# Patient Record
Sex: FEMALE | Race: WHITE | NOT HISPANIC OR LATINO | Employment: FULL TIME | URBAN - METROPOLITAN AREA
[De-identification: names, ages, dates, MRNs, and addresses within clinical notes are randomized per-mention and may not be internally consistent; named-entity substitution may affect disease eponyms.]

---

## 2018-08-30 RX ORDER — METFORMIN HCL 500 MG
1000 TABLET, EXTENDED RELEASE 24 HR ORAL DAILY
COMMUNITY

## 2018-08-30 RX ORDER — TOLTERODINE 4 MG/1
4 CAPSULE, EXTENDED RELEASE ORAL DAILY
COMMUNITY

## 2018-08-31 PROBLEM — N95.0 POST-MENOPAUSAL BLEEDING: Status: ACTIVE | Noted: 2018-08-31

## 2018-09-04 ENCOUNTER — ANESTHESIA EVENT (OUTPATIENT)
Dept: SURGERY | Facility: CLINIC | Age: 58
End: 2018-09-04
Payer: COMMERCIAL

## 2018-09-05 ENCOUNTER — ANESTHESIA (OUTPATIENT)
Dept: SURGERY | Facility: CLINIC | Age: 58
End: 2018-09-05
Payer: COMMERCIAL

## 2018-09-05 ENCOUNTER — SURGERY (OUTPATIENT)
Age: 58
End: 2018-09-05

## 2018-09-05 ENCOUNTER — HOSPITAL ENCOUNTER (OUTPATIENT)
Facility: CLINIC | Age: 58
Discharge: HOME | End: 2018-09-05
Attending: SPECIALIST | Admitting: SPECIALIST
Payer: COMMERCIAL

## 2018-09-05 VITALS
RESPIRATION RATE: 16 BRPM | TEMPERATURE: 98.1 F | SYSTOLIC BLOOD PRESSURE: 112 MMHG | HEIGHT: 63 IN | BODY MASS INDEX: 39.46 KG/M2 | OXYGEN SATURATION: 97 % | HEART RATE: 86 BPM | WEIGHT: 222.7 LBS | DIASTOLIC BLOOD PRESSURE: 75 MMHG

## 2018-09-05 DIAGNOSIS — N95.0 POST-MENOPAUSAL BLEEDING: Primary | ICD-10-CM

## 2018-09-05 LAB — B-HCG SERPL-ACNC: 3 IU/L (ref 0–5)

## 2018-09-05 PROCEDURE — 25000128 H RX IP 250 OP 636: Performed by: NURSE ANESTHETIST, CERTIFIED REGISTERED

## 2018-09-05 PROCEDURE — 36000058 ZZH SURGERY LEVEL 3 EA 15 ADDTL MIN: Performed by: SPECIALIST

## 2018-09-05 PROCEDURE — 88305 TISSUE EXAM BY PATHOLOGIST: CPT | Mod: 26 | Performed by: SPECIALIST

## 2018-09-05 PROCEDURE — 84702 CHORIONIC GONADOTROPIN TEST: CPT | Performed by: SPECIALIST

## 2018-09-05 PROCEDURE — 25800025 ZZH RX 258: Performed by: SPECIALIST

## 2018-09-05 PROCEDURE — 25000125 ZZHC RX 250: Performed by: NURSE ANESTHETIST, CERTIFIED REGISTERED

## 2018-09-05 PROCEDURE — 71000013 ZZH RECOVERY PHASE 1 LEVEL 1 EA ADDTL HR: Performed by: SPECIALIST

## 2018-09-05 PROCEDURE — 25000566 ZZH SEVOFLURANE, EA 15 MIN: Performed by: SPECIALIST

## 2018-09-05 PROCEDURE — 25000128 H RX IP 250 OP 636: Performed by: ANESTHESIOLOGY

## 2018-09-05 PROCEDURE — 40000170 ZZH STATISTIC PRE-PROCEDURE ASSESSMENT II: Performed by: SPECIALIST

## 2018-09-05 PROCEDURE — 36415 COLL VENOUS BLD VENIPUNCTURE: CPT | Performed by: SPECIALIST

## 2018-09-05 PROCEDURE — 88305 TISSUE EXAM BY PATHOLOGIST: CPT | Performed by: SPECIALIST

## 2018-09-05 PROCEDURE — 25000128 H RX IP 250 OP 636: Performed by: SPECIALIST

## 2018-09-05 PROCEDURE — 36000056 ZZH SURGERY LEVEL 3 1ST 30 MIN: Performed by: SPECIALIST

## 2018-09-05 PROCEDURE — 37000008 ZZH ANESTHESIA TECHNICAL FEE, 1ST 30 MIN: Performed by: SPECIALIST

## 2018-09-05 PROCEDURE — 71000012 ZZH RECOVERY PHASE 1 LEVEL 1 FIRST HR: Performed by: SPECIALIST

## 2018-09-05 PROCEDURE — 25000132 ZZH RX MED GY IP 250 OP 250 PS 637: Performed by: SPECIALIST

## 2018-09-05 PROCEDURE — 27210794 ZZH OR GENERAL SUPPLY STERILE: Performed by: SPECIALIST

## 2018-09-05 PROCEDURE — 37000009 ZZH ANESTHESIA TECHNICAL FEE, EACH ADDTL 15 MIN: Performed by: SPECIALIST

## 2018-09-05 PROCEDURE — 71000027 ZZH RECOVERY PHASE 2 EACH 15 MINS: Performed by: SPECIALIST

## 2018-09-05 RX ORDER — FENTANYL CITRATE 50 UG/ML
25-50 INJECTION, SOLUTION INTRAMUSCULAR; INTRAVENOUS
Status: DISCONTINUED | OUTPATIENT
Start: 2018-09-05 | End: 2018-09-05 | Stop reason: HOSPADM

## 2018-09-05 RX ORDER — ALBUTEROL SULFATE 0.83 MG/ML
2.5 SOLUTION RESPIRATORY (INHALATION) EVERY 4 HOURS PRN
Status: DISCONTINUED | OUTPATIENT
Start: 2018-09-05 | End: 2018-09-05 | Stop reason: HOSPADM

## 2018-09-05 RX ORDER — DEXAMETHASONE SODIUM PHOSPHATE 4 MG/ML
INJECTION, SOLUTION INTRA-ARTICULAR; INTRALESIONAL; INTRAMUSCULAR; INTRAVENOUS; SOFT TISSUE PRN
Status: DISCONTINUED | OUTPATIENT
Start: 2018-09-05 | End: 2018-09-05

## 2018-09-05 RX ORDER — PROPOFOL 10 MG/ML
INJECTION, EMULSION INTRAVENOUS PRN
Status: DISCONTINUED | OUTPATIENT
Start: 2018-09-05 | End: 2018-09-05

## 2018-09-05 RX ORDER — SODIUM CHLORIDE, SODIUM LACTATE, POTASSIUM CHLORIDE, CALCIUM CHLORIDE 600; 310; 30; 20 MG/100ML; MG/100ML; MG/100ML; MG/100ML
INJECTION, SOLUTION INTRAVENOUS CONTINUOUS PRN
Status: DISCONTINUED | OUTPATIENT
Start: 2018-09-05 | End: 2018-09-05

## 2018-09-05 RX ORDER — ONDANSETRON 2 MG/ML
INJECTION INTRAMUSCULAR; INTRAVENOUS PRN
Status: DISCONTINUED | OUTPATIENT
Start: 2018-09-05 | End: 2018-09-05

## 2018-09-05 RX ORDER — HYDROCODONE BITARTRATE AND ACETAMINOPHEN 5; 325 MG/1; MG/1
1-2 TABLET ORAL EVERY 4 HOURS PRN
Qty: 5 TABLET | Refills: 0 | Status: ON HOLD | OUTPATIENT
Start: 2018-09-05 | End: 2021-05-17

## 2018-09-05 RX ORDER — SODIUM CHLORIDE, SODIUM LACTATE, POTASSIUM CHLORIDE, CALCIUM CHLORIDE 600; 310; 30; 20 MG/100ML; MG/100ML; MG/100ML; MG/100ML
INJECTION, SOLUTION INTRAVENOUS CONTINUOUS
Status: DISCONTINUED | OUTPATIENT
Start: 2018-09-05 | End: 2018-09-05 | Stop reason: HOSPADM

## 2018-09-05 RX ORDER — HYDROCODONE BITARTRATE AND ACETAMINOPHEN 5; 325 MG/1; MG/1
1 TABLET ORAL
Status: COMPLETED | OUTPATIENT
Start: 2018-09-05 | End: 2018-09-05

## 2018-09-05 RX ORDER — ACETAMINOPHEN 650 MG/1
650 SUPPOSITORY RECTAL EVERY 4 HOURS PRN
Status: DISCONTINUED | OUTPATIENT
Start: 2018-09-05 | End: 2018-09-05 | Stop reason: HOSPADM

## 2018-09-05 RX ORDER — NALOXONE HYDROCHLORIDE 0.4 MG/ML
.1-.4 INJECTION, SOLUTION INTRAMUSCULAR; INTRAVENOUS; SUBCUTANEOUS
Status: DISCONTINUED | OUTPATIENT
Start: 2018-09-05 | End: 2018-09-05 | Stop reason: HOSPADM

## 2018-09-05 RX ORDER — ONDANSETRON 2 MG/ML
4 INJECTION INTRAMUSCULAR; INTRAVENOUS EVERY 30 MIN PRN
Status: DISCONTINUED | OUTPATIENT
Start: 2018-09-05 | End: 2018-09-05 | Stop reason: HOSPADM

## 2018-09-05 RX ORDER — HYDROMORPHONE HYDROCHLORIDE 1 MG/ML
.3-.5 INJECTION, SOLUTION INTRAMUSCULAR; INTRAVENOUS; SUBCUTANEOUS EVERY 10 MIN PRN
Status: DISCONTINUED | OUTPATIENT
Start: 2018-09-05 | End: 2018-09-05 | Stop reason: HOSPADM

## 2018-09-05 RX ORDER — MEPERIDINE HYDROCHLORIDE 25 MG/ML
12.5 INJECTION INTRAMUSCULAR; INTRAVENOUS; SUBCUTANEOUS
Status: DISCONTINUED | OUTPATIENT
Start: 2018-09-05 | End: 2018-09-05 | Stop reason: HOSPADM

## 2018-09-05 RX ORDER — ONDANSETRON 4 MG/1
4 TABLET, ORALLY DISINTEGRATING ORAL EVERY 30 MIN PRN
Status: DISCONTINUED | OUTPATIENT
Start: 2018-09-05 | End: 2018-09-05 | Stop reason: HOSPADM

## 2018-09-05 RX ORDER — KETOROLAC TROMETHAMINE 30 MG/ML
30 INJECTION, SOLUTION INTRAMUSCULAR; INTRAVENOUS ONCE
Status: COMPLETED | OUTPATIENT
Start: 2018-09-05 | End: 2018-09-05

## 2018-09-05 RX ORDER — PROPOFOL 10 MG/ML
INJECTION, EMULSION INTRAVENOUS CONTINUOUS PRN
Status: DISCONTINUED | OUTPATIENT
Start: 2018-09-05 | End: 2018-09-05

## 2018-09-05 RX ORDER — FENTANYL CITRATE 50 UG/ML
INJECTION, SOLUTION INTRAMUSCULAR; INTRAVENOUS PRN
Status: DISCONTINUED | OUTPATIENT
Start: 2018-09-05 | End: 2018-09-05

## 2018-09-05 RX ORDER — LIDOCAINE HYDROCHLORIDE 20 MG/ML
INJECTION, SOLUTION INFILTRATION; PERINEURAL PRN
Status: DISCONTINUED | OUTPATIENT
Start: 2018-09-05 | End: 2018-09-05

## 2018-09-05 RX ADMIN — FENTANYL CITRATE 25 MCG: 50 INJECTION, SOLUTION INTRAMUSCULAR; INTRAVENOUS at 07:37

## 2018-09-05 RX ADMIN — MIDAZOLAM 2 MG: 1 INJECTION INTRAMUSCULAR; INTRAVENOUS at 07:31

## 2018-09-05 RX ADMIN — PHENYLEPHRINE HYDROCHLORIDE 100 MCG: 10 INJECTION, SOLUTION INTRAMUSCULAR; INTRAVENOUS; SUBCUTANEOUS at 07:39

## 2018-09-05 RX ADMIN — PROPOFOL 200 MG: 10 INJECTION, EMULSION INTRAVENOUS at 07:37

## 2018-09-05 RX ADMIN — LIDOCAINE HYDROCHLORIDE 40 MG: 20 INJECTION, SOLUTION INFILTRATION; PERINEURAL at 07:37

## 2018-09-05 RX ADMIN — DEXAMETHASONE SODIUM PHOSPHATE 4 MG: 4 INJECTION, SOLUTION INTRA-ARTICULAR; INTRALESIONAL; INTRAMUSCULAR; INTRAVENOUS; SOFT TISSUE at 07:44

## 2018-09-05 RX ADMIN — HYDROCODONE BITARTRATE AND ACETAMINOPHEN 1 TABLET: 5; 325 TABLET ORAL at 09:07

## 2018-09-05 RX ADMIN — PHENYLEPHRINE HYDROCHLORIDE 200 MCG: 10 INJECTION, SOLUTION INTRAMUSCULAR; INTRAVENOUS; SUBCUTANEOUS at 07:48

## 2018-09-05 RX ADMIN — FENTANYL CITRATE 50 MCG: 50 INJECTION INTRAMUSCULAR; INTRAVENOUS at 08:32

## 2018-09-05 RX ADMIN — ONDANSETRON 4 MG: 2 INJECTION INTRAMUSCULAR; INTRAVENOUS at 07:43

## 2018-09-05 RX ADMIN — SODIUM CHLORIDE 200 ML: 900 IRRIGANT IRRIGATION at 08:00

## 2018-09-05 RX ADMIN — KETOROLAC TROMETHAMINE 30 MG: 30 INJECTION, SOLUTION INTRAMUSCULAR at 08:15

## 2018-09-05 RX ADMIN — SODIUM CHLORIDE, POTASSIUM CHLORIDE, SODIUM LACTATE AND CALCIUM CHLORIDE: 600; 310; 30; 20 INJECTION, SOLUTION INTRAVENOUS at 07:31

## 2018-09-05 RX ADMIN — PHENYLEPHRINE HYDROCHLORIDE 100 MCG: 10 INJECTION, SOLUTION INTRAMUSCULAR; INTRAVENOUS; SUBCUTANEOUS at 07:42

## 2018-09-05 RX ADMIN — PROPOFOL 200 MCG/KG/MIN: 10 INJECTION, EMULSION INTRAVENOUS at 07:37

## 2018-09-05 ASSESSMENT — ENCOUNTER SYMPTOMS: ORTHOPNEA: 0

## 2018-09-05 NOTE — OP NOTE
Procedure Date: 2018      DATE OF PROCEDURE: 2018.      PREOPERATIVE DIAGNOSIS:  Postmenopausal bleeding.      POSTOPERATIVE DIAGNOSIS:  Postmenopausal bleeding, endometrial polyp.      PROCEDURE:  Hysteroscopy, dilatation and curettage.      SURGEON:  April Hankins MD      ANESTHESIA:  General.      FINDINGS:  Small endometrial polyp on the posterior wall of the uterus.      ESTIMATED BLOOD LOSS:  Less than 5 mL.      COMPLICATIONS:  None.      INDICATIONS FOR SURGERY:  The patient is a 57-year-old female status post menopause 3 years ago who presented to the clinic with postmenopausal bleeding.  An ultrasound was performed which showed a thickened endometrium at 7 mm.  Endometrial biopsy was negative.  The decision was made to proceed with hysteroscopy, D and C for a  definitive diagnosis and management.  Risks and benefits were discussed including bleeding, infection, injury to the uterus including perforation.      DESCRIPTION OF PROCEDURE:  After the administration of general anesthetic, the patient was prepped and draped in the usual fashion.  A straight catheter was used to drain her bladder.  A timeout was held.      A speculum was placed within the vagina.  A single-tooth tenaculum placed on the anterior lip of the cervix.  Cervix was dilated to 5 mm.  The hysteroscope was inserted through.  There was minimal endometrial tissue, one small polyp on the posterior aspect.      Hysteroscope was removed and sharp curettage was performed with a small amount of tissue and polyp noted.  The hysteroscope was inserted again, no further tissue noted.  All instruments were removed.  Tenaculum sites were noted to be hemostatic.  The patient was recalled from anesthetic without difficulty.  Sponge counts correct.         APRIL HANKINS MD             D: 2018   T: 2018   MT: AUGUSTINE      Name:     МАРИНА MONGE   MRN:      [id number]        Account:        [id number]   :       1960           Procedure Date: 09/05/2018      Document: [id number]

## 2018-09-05 NOTE — IP AVS SNAPSHOT
MRN:[id number]                      After Visit Summary   9/5/2018    Estela Menjivar    MRN: [id number]           Thank you!     Thank you for choosing [facility] for your care. Our goal is always to provide you with excellent care. Hearing back from our patients is one way we can continue to improve our services. Please take a few minutes to complete the written survey that you may receive in the mail after you visit with us. Thank you!        Patient Information     Date Of Birth          1960        About your hospital stay     You were admitted on:  September 5, 2018 You last received care in the:  [facility]    You were discharged on:  September 5, 2018       Who to Call     For medical emergencies, please call 911.  For non-urgent questions about your medical care, please call your primary care provider or clinic, [phone]  For questions related to your surgery, please call your surgery clinic        Attending Provider     Provider Specialty    April Lara MD OB/Gyn       Primary Care Provider Office Phone # Fax #    Lory Luke [phone]      After Care Instructions     Discharge Instructions       Pelvic Rest. No tampons, douching or intercourse for  1  weeks.            Discharge Instructions       Patient to arrange follow up appointment in 1-2 weeks            Shower        Shower on Post-op day  1.   DO NOT take a bath                  Further instructions from your care team       Same Day Surgery Discharge Instructions for  Sedation and General Anesthesia       It's not unusual to feel dizzy, light-headed or faint for up to 24 hours after surgery or while taking pain medication.  If you have these symptoms: sit for a few minutes before standing and have someone assist you when you get up to walk or use the bathroom.      You should rest and relax for the next 24 hours. We recommend you make arrangements to have an adult  stay with you for at least 24 hours after your discharge.  Avoid hazardous and strenuous activity.      DO NOT DRIVE any vehicle or operate mechanical equipment for 24 hours following the end of your surgery.  Even though you may feel normal, your reactions may be affected by the medication you have received.      Do not drink alcoholic beverages for 24 hours following surgery.       Slowly progress to your regular diet as you feel able. It's not unusual to feel nauseated and/or vomit after receiving anesthesia.  If you develop these symptoms, drink clear liquids (apple juice, ginger ale, broth, 7-up, etc. ) until you feel better.  If your nausea and vomiting persists for 24 hours, please notify your surgeon.        All narcotic pain medications, along with inactivity and anesthesia, can cause constipation. Drinking plenty of liquids and increasing fiber intake will help.      For any questions of a medical nature, call your surgeon.      Do not make important decisions for 24 hours.      If you had general anesthesia, you may have a sore throat for a couple of days related to the breathing tube used during surgery.  You may use Cepacol lozenges to help with this discomfort.  If it worsens or if you develop a fever, contact your surgeon.     If you feel your pain is not well managed with the pain medications prescribed by your surgeon, please contact your surgeon's office to let them know so they can address your concerns.       D & C DISCHARGE INSTRUCTIONS      ACTIVITIES:    You may resume normal activities including lifting as needed, but no intercourse for 2 weeks.  You may climb stairs, bathe or shower.   Unless you are taking narcotic pain medication, it is permissible to drive a car in 24 hours.    FOLLOW-UP:  As instructed by surgeon        VAGINAL DRAINAGE: You may have some vaginal bleeding or discharge for about a week after discharge. Tampons may be inserted into the vagina for the discharge or  "bleeding.    WHEN TO CALL YOUR DOCTOR:  Call your doctor right away if you have any of the following:  Fever above 100.4 F (38 C) or chills  Vaginal bleeding that soaks more than one sanitary pad per hour  A foul smelling discharge from the vagina  Difficulty urinating  Severe pain                    [facility]   MARTIN Zee, MOLINA Palacio R. Ormsby, M.Kasbohm &SHERMAN Beckham      [facility] Office:    [facility] Office:    [address] [place] [address]    [address] [phone]    [facility] [place] [address] [phone]         Today you received Toradol, an antiinflammatory medication similar to Ibuprofen.  You should not take other antiinflammatory medication, such as Ibuprofen, Motrin, Advil, Aleve, Naprosyn, etc until 2:15pm.         Pending Results     Date and Time Order Name Status Description    9/5/2018 0754 Surgical pathology exam In process             Admission Information     Date & Time Provider Department Dept. Phone    9/5/2018 April Lara MD [facility] [phone]      Your Vitals Were     Blood Pressure Pulse Temperature Respirations Height Weight    99/73 86 98.1  F (36.7  C) 11 1.6 m (5' 3\") 101 kg (222 lb 11.2 oz)    Pulse Oximetry BMI (Body Mass Index)                94% 39.45 kg/m2          [employer] Information     [employer] lets you send messages to your doctor, view your test results, renew your prescriptions, schedule appointments and more. To sign up, go to [email or web address] . Click on \"Log in\" on the left side of the screen, which will take you to the Welcome page. Then click on \"Sign up Now\" on the right side of the page.     You will be asked to enter the access code listed below, as well as some personal information. Please follow the directions to create your username and password.     Your access code is: [email or web address]  Expires: 12/4/2018  " 8:24 AM     Your access code will  in 90 days. If you need help or a new code, please call your [place] clinic or [phone].        Care EveryWhere ID     This is your Care EveryWhere ID. This could be used by other organizations to access your [place] medical records  [id number]        Equal Access to Services     JAY MARTIN : [place] olea hadjacksonjanice Sorogelioali, waaxda luqadaha, qaybta kaalmada nalinichavezdario, dina villanuevahalinaskye cameron . So [facility] [phone].    ATENCIÓN: Si habla español, tiene a zarate disposición servicios gratuitos de asistencia lingüística. Albin al [phone].    We comply with applicable federal civil rights laws and [place] laws. We do not discriminate on the basis of race, color, national origin, age, disability, sex, sexual orientation, or gender identity.               Review of your medicines      START taking        Dose / Directions    HYDROcodone-acetaminophen 5-325 MG per tablet   Commonly known as:  NORCO   Used for:  Post-menopausal bleeding   Notes to Patient:  ONE TABLET TAKEN @ 9:07AM        Dose:  1-2 tablet   Take 1-2 tablets by mouth every 4 hours as needed for other (Moderate to Severe Pain)   Quantity:  5 tablet   Refills:  0         CONTINUE these medicines which have NOT CHANGED        Dose / Directions    DETROL LA PO        Dose:  4 mg   Take 4 mg by mouth daily   Refills:  0       GLUCOPHAGE XR PO        Dose:  500 mg   Take 500 mg by mouth daily   Refills:  0       IBUPROFEN PO   Notes to Patient:  START AFTER 2:15PM        Take  by mouth.   Refills:  0       lisinopril 10 MG tablet   Commonly known as:  PRINIVIL/ZESTRIL        Dose:  20 mg   Take 20 mg by mouth daily   Refills:  0       SIMVASTATIN PO        Dose:  20 mg   Take 20 mg by mouth daily   Refills:  0       TYLENOL PO        Take  by mouth.   Refills:  0            Where to get your medicines      Some of these will need a paper prescription and others can be bought over the counter.  Ask your nurse if you have questions.     Bring a paper prescription for each of these medications     HYDROcodone-acetaminophen 5-325 MG per tablet                Protect others around you: Learn how to safely use, store and throw away your medicines at www.disposemymeds.org.        Information about OPIOIDS     PRESCRIPTION OPIOIDS: WHAT YOU NEED TO KNOW   We gave you an opioid (narcotic) pain medicine. It is important to manage your pain, but opioids are not always the best choice. You should first try all the other options your care team gave you. Take this medicine for as short a time (and as few doses) as possible.    Some activities can increase your pain, such as bandage changes or therapy sessions. It may help to take your pain medicine 30 to 60 minutes before these activities. Reduce your stress by getting enough sleep, working on hobbies you enjoy and practicing relaxation or meditation. Talk to your care team about ways to manage your pain beyond prescription opioids.    These medicines have risks:    DO NOT drive when on new or higher doses of pain medicine. These medicines can affect your alertness and reaction times, and you could be arrested for driving under the influence (DUI). If you need to use opioids long-term, talk to your care team about driving.    DO NOT operate heavy machinery    DO NOT do any other dangerous activities while taking these medicines.    DO NOT drink any alcohol while taking these medicines.     If the opioid prescribed includes acetaminophen, DO NOT take with any other medicines that contain acetaminophen. Read all labels carefully. Look for the word  acetaminophen  or  Tylenol.  Ask your pharmacist if you have questions or are unsure.    You can get addicted to pain medicines, especially if you have a history of addiction (chemical, alcohol or substance dependence). Talk to your care team about ways to reduce this risk.    All opioids tend to cause constipation. Drink  plenty of water and eat foods that have a lot of fiber, such as fruits, vegetables, prune juice, apple juice and high-fiber cereal. Take a laxative (Miralax, milk of magnesia, Colace, Senna) if you don t move your bowels at least every other day. Other side effects include upset stomach, sleepiness, dizziness, throwing up, tolerance (needing more of the medicine to have the same effect), physical dependence and slowed breathing.    Store your pills in a secure place, locked if possible. We will not replace any lost or stolen medicine. If you don t finish your medicine, please throw away (dispose) as directed by your pharmacist. The [place] Pollution Control Agency has more information about safe disposal: https://www.pca.[facility].[place].us/living-green/managing-unwanted-medications             Medication List: This is a list of all your medications and when to take them. Check marks below indicate your daily home schedule. Keep this list as a reference.      Medications           Morning Afternoon Evening Bedtime As Needed    DETROL LA PO   Take 4 mg by mouth daily                                GLUCOPHAGE XR PO   Take 500 mg by mouth daily                                HYDROcodone-acetaminophen 5-325 MG per tablet   Commonly known as:  NORCO   Take 1-2 tablets by mouth every 4 hours as needed for other (Moderate to Severe Pain)   Last time this was given:  1 tablet on 9/5/2018  9:07 AM   Notes to Patient:  ONE TABLET TAKEN @ 9:07AM                                IBUPROFEN PO   Take  by mouth.   Notes to Patient:  START AFTER 2:15PM                                lisinopril 10 MG tablet   Commonly known as:  PRINIVIL/ZESTRIL   Take 20 mg by mouth daily                                SIMVASTATIN PO   Take 20 mg by mouth daily                                TYLENOL PO   Take  by mouth.

## 2018-09-05 NOTE — ANESTHESIA CARE TRANSFER NOTE
Patient: Estela Menjivar    Procedure(s):  COMBINED DILATION AND CURETTAGE, HYSTEROSCOPY  - Wound Class: II-Clean Contaminated    Diagnosis: POST MENOPAUSAL BLEEDING   Diagnosis Additional Information: No value filed.    Anesthesia Type:   General, LMA     Note:  Airway :Face Mask  Patient transferred to:PACU  Comments: Transferred to PACU, spontaneous respirations, 10L oxygen via facemask.  All monitors and alarms on and functioning, VSS.  Patient awake, comfortable.  Report to PACU RN.Handoff Report: Identifed the Patient, Identified the Reponsible Provider, Reviewed the pertinent medical history, Discussed the surgical course, Reviewed Intra-OP anesthesia mangement and issues during anesthesia, Set expectations for post-procedure period and Allowed opportunity for questions and acknowledgement of understanding      Vitals: (Last set prior to Anesthesia Care Transfer)    CRNA VITALS  9/5/2018 0737 - 9/5/2018 0811      9/5/2018             Pulse: 88    SpO2: 95 %    Resp Rate (observed): 14                Electronically Signed By: MARIFER Scott CRNA  September 5, 2018  8:11 AM

## 2018-09-05 NOTE — ANESTHESIA PREPROCEDURE EVALUATION
Anesthesia Evaluation     . Pt has had prior anesthetic.     No history of anesthetic complications          ROS/MED HX    ENT/Pulmonary:      (-) asthma and sleep apnea   Neurologic:       Cardiovascular:     (+) Dyslipidemia, hypertension----. : . . . :. .      (-) DÍAZ, orthopnea/PND and syncope   METS/Exercise Tolerance:  >4 METS   Hematologic:     (+) Anemia, -      Musculoskeletal:         GI/Hepatic: Comment: Splenectomy-splenosis        (-) GERD   Renal/Genitourinary: Comment: UI    (+) Nephrolithiasis ,       Endo: Comment: Vag bleeding    (+) type II DM Obesity, .      Psychiatric:         Infectious Disease:         Malignancy:         Other: Comment: Left eye tear duct dyfn                     Physical Exam      Airway   Mallampati: II  TM distance: >3 FB  Neck ROM: full    Dental   (+) caps    Cardiovascular   Rhythm and rate: regular      Pulmonary    breath sounds clear to auscultation                    Anesthesia Plan      History & Physical Review  History and physical reviewed and following examination; no interval change.    ASA Status:  2 .        Plan for General and LMA   PONV prophylaxis:  Ondansetron (or other 5HT-3) and Dexamethasone or Solumedrol  Propofol infusion      Postoperative Care      Consents  Anesthetic plan, risks, benefits and alternatives discussed with:  Patient..                          .  Procedure: Procedure(s):  COMBINED DILATION AND CURETTAGE, OPERATIVE HYSTEROSCOPY  Preop diagnosis: POST MENOPAUSAL BLEEDING     No Known Allergies  Past Medical History:   Diagnosis Date     Diabetes mellitus (H)      H/O splenectomy      Hyperlipidemia      Hypertension      IFG (impaired fasting glucose)      Kidney stones      Splenosis      Urinary incontinence      Past Surgical History:   Procedure Laterality Date     ABDOMEN SURGERY      laparotomy( colon nick ), repair liver bleed,spleenectomy     COLONOSCOPY       GYN SURGERY      c. section     HERNIA REPAIR      ventral  hernia     ORTHOPEDIC SURGERY      ankle fracture repair     spleenectomy       Social History   Substance Use Topics     Smoking status: Never Smoker     Smokeless tobacco: Never Used     Alcohol use Yes      Comment: 1/month     Prior to Admission medications    Medication Sig Start Date End Date Taking? Authorizing Provider   Acetaminophen (TYLENOL PO) Take  by mouth.   Yes Reported, Patient   IBUPROFEN PO Take  by mouth.   Yes Reported, Patient   lisinopril (LISINOPRIL PO) Take 20 mg by mouth daily    Yes Reported, Patient   MetFORMIN HCl (GLUCOPHAGE XR PO) Take 500 mg by mouth daily   Yes Reported, Patient   SIMVASTATIN PO Take 20 mg by mouth daily    Yes Reported, Patient   Tolterodine Tartrate (DETROL LA PO) Take 4 mg by mouth daily   Yes Reported, Patient     Current Facility-Administered Medications Ordered in Epic   Medication Dose Route Frequency Last Rate Last Dose     PRE OP antibiotics NOT needed for this surgical procedure  1 each As instructed Continuous         No current [facility]-ordered outpatient prescriptions on file.       no pre procedure antibiotic needed       Wt Readings from Last 1 Encounters:   09/05/18 101 kg (222 lb 11.2 oz)     Temp Readings from Last 1 Encounters:   09/05/18 36.2  C (97.1  F) (Oral)     BP Readings from Last 6 Encounters:   09/05/18 142/81   01/06/12 109/76     Pulse Readings from Last 4 Encounters:   09/05/18 86   01/05/12 78     Resp Readings from Last 1 Encounters:   09/05/18 18     SpO2 Readings from Last 1 Encounters:   09/05/18 95%     Recent Labs   Lab Test  01/05/12   2310   NA  138   POTASSIUM  4.0   CHLORIDE  103   CO2  26   ANIONGAP  9   GLC  113*   BUN  13   CR  0.58   ELPIDIO  9.3     No results for input(s): AST, ALT, ALKPHOS, BILITOTAL, LIPASE in the last 07207 hours.  Recent Labs   Lab Test  01/05/12   2310   WBC  13.0*   HGB  11.5*   PLT  489*     No results for input(s): ABO, RH in the last 41856 hours.  No results for input(s): INR, PTT in the last 97529  hours.   No results for input(s): TROPI in the last 09699 hours.  No results for input(s): PH, PCO2, PO2, HCO3 in the last 92602 hours.  No results for input(s): HCG in the last 22444 hours.  No results found for this or any previous visit (from the past 744 hour(s)).    RECENT LABS:   ECG:   ECHO:

## 2018-09-05 NOTE — DISCHARGE INSTRUCTIONS
Same Day Surgery Discharge Instructions for  Sedation and General Anesthesia       It's not unusual to feel dizzy, light-headed or faint for up to 24 hours after surgery or while taking pain medication.  If you have these symptoms: sit for a few minutes before standing and have someone assist you when you get up to walk or use the bathroom.      You should rest and relax for the next 24 hours. We recommend you make arrangements to have an adult stay with you for at least 24 hours after your discharge.  Avoid hazardous and strenuous activity.      DO NOT DRIVE any vehicle or operate mechanical equipment for 24 hours following the end of your surgery.  Even though you may feel normal, your reactions may be affected by the medication you have received.      Do not drink alcoholic beverages for 24 hours following surgery.       Slowly progress to your regular diet as you feel able. It's not unusual to feel nauseated and/or vomit after receiving anesthesia.  If you develop these symptoms, drink clear liquids (apple juice, ginger ale, broth, 7-up, etc. ) until you feel better.  If your nausea and vomiting persists for 24 hours, please notify your surgeon.        All narcotic pain medications, along with inactivity and anesthesia, can cause constipation. Drinking plenty of liquids and increasing fiber intake will help.      For any questions of a medical nature, call your surgeon.      Do not make important decisions for 24 hours.      If you had general anesthesia, you may have a sore throat for a couple of days related to the breathing tube used during surgery.  You may use Cepacol lozenges to help with this discomfort.  If it worsens or if you develop a fever, contact your surgeon.     If you feel your pain is not well managed with the pain medications prescribed by your surgeon, please contact your surgeon's office to let them know so they can address your concerns.       D & C DISCHARGE INSTRUCTIONS       ACTIVITIES:    You may resume normal activities including lifting as needed, but no intercourse for 2 weeks.  You may climb stairs, bathe or shower.   Unless you are taking narcotic pain medication, it is permissible to drive a car in 24 hours.    FOLLOW-UP:  As instructed by surgeon        VAGINAL DRAINAGE: You may have some vaginal bleeding or discharge for about a week after discharge. Tampons may be inserted into the vagina for the discharge or bleeding.    WHEN TO CALL YOUR DOCTOR:  Call your doctor right away if you have any of the following:  Fever above 100.4 F (38 C) or chills  Vaginal bleeding that soaks more than one sanitary pad per hour  A foul smelling discharge from the vagina  Difficulty urinating  Severe pain                    [facility]   MARTIN Zee M. Hanno,    TAMMI Wright M.Kasbohm &SHERMAN Beckham      [facility] Office:    [facility] Office:    [address] [place] [address]    [address] [phone]    [place], [place] [id number] [phone]         Today you received Toradol, an antiinflammatory medication similar to Ibuprofen.  You should not take other antiinflammatory medication, such as Ibuprofen, Motrin, Advil, Aleve, Naprosyn, etc until 2:15pm.

## 2018-09-05 NOTE — BRIEF OP NOTE
[facility] Brief Operative Note    Pre-operative diagnosis: POST MENOPAUSAL BLEEDING    Post-operative diagnosis * No post-op diagnosis entered *     Procedure: Procedure(s):  COMBINED DILATION AND CURETTAGE, HYSTEROSCOPY  - Wound Class: II-Clean Contaminated   Surgeon(s): Surgeon(s) and Role:     * April Lara MD - Primary   Estimated blood loss: * No values recorded between 9/5/2018  7:48 AM and 9/5/2018  8:01 AM *    Specimens:   ID Type Source Tests Collected by Time Destination   A : endometrial polyp Tissue Endometrium SURGICAL PATHOLOGY EXAM April Lara MD 9/5/2018  7:52 AM       Findings: Small polyp on posterior wall of the uteus

## 2018-09-05 NOTE — IP AVS SNAPSHOT
[facility]    [address]    [place] [address]    Phone:  [phone]    Fax:  [phone]                                       After Visit Summary   9/5/2018    Estela Menjivar    MRN: [id number]           After Visit Summary Signature Page     I have received my discharge instructions, and my questions have been answered. I have discussed any challenges I see with this plan with the nurse or doctor.    ..........................................................................................................................................  Patient/Patient Representative Signature      ..........................................................................................................................................  Patient Representative Print Name and Relationship to Patient    ..................................................               ................................................  Date                                            Time    ..........................................................................................................................................  Reviewed by Signature/Title    ...................................................              ..............................................  Date                                                            Time          22EPIC Rev 08/18

## 2018-09-05 NOTE — ANESTHESIA POSTPROCEDURE EVALUATION
Patient: Estela Menjivar    Procedure(s):  COMBINED DILATION AND CURETTAGE, HYSTEROSCOPY  - Wound Class: II-Clean Contaminated    Diagnosis:POST MENOPAUSAL BLEEDING   Diagnosis Additional Information: No value filed.    Anesthesia Type:  General, LMA    Note:  Anesthesia Post Evaluation    Patient location during evaluation: PACU  Patient participation: Able to fully participate in evaluation  Level of consciousness: awake  Pain management: adequate  Airway patency: patent  Cardiovascular status: acceptable  Respiratory status: acceptable  Hydration status: acceptable  PONV: controlled     Anesthetic complications: None          Last vitals:  Vitals:    09/05/18 0845 09/05/18 0900 09/05/18 0945   BP: 94/58 95/67 112/75   Pulse:      Resp: 12 13 16   Temp:      SpO2: 94% 94% 97%         Electronically Signed By: Kizzy Painter MD  September 5, 2018  3:37 PM

## 2018-09-06 LAB — COPATH REPORT: NORMAL

## 2021-05-17 ENCOUNTER — APPOINTMENT (OUTPATIENT)
Dept: GENERAL RADIOLOGY | Facility: CLINIC | Age: 61
End: 2021-05-17
Attending: UROLOGY
Payer: COMMERCIAL

## 2021-05-17 ENCOUNTER — HOSPITAL ENCOUNTER (OUTPATIENT)
Facility: CLINIC | Age: 61
Setting detail: OBSERVATION
Discharge: HOME | End: 2021-05-18
Attending: EMERGENCY MEDICINE | Admitting: INTERNAL MEDICINE
Payer: COMMERCIAL

## 2021-05-17 ENCOUNTER — ANESTHESIA (OUTPATIENT)
Dept: SURGERY | Facility: CLINIC | Age: 61
End: 2021-05-17
Payer: COMMERCIAL

## 2021-05-17 ENCOUNTER — APPOINTMENT (OUTPATIENT)
Dept: CT IMAGING | Facility: CLINIC | Age: 61
End: 2021-05-17
Attending: EMERGENCY MEDICINE
Payer: COMMERCIAL

## 2021-05-17 ENCOUNTER — ANESTHESIA EVENT (OUTPATIENT)
Dept: SURGERY | Facility: CLINIC | Age: 61
End: 2021-05-17
Payer: COMMERCIAL

## 2021-05-17 DIAGNOSIS — N23 RENAL COLIC: ICD-10-CM

## 2021-05-17 DIAGNOSIS — N20.1 URETERAL STONE: Primary | ICD-10-CM

## 2021-05-17 DIAGNOSIS — N39.0 UTI (URINARY TRACT INFECTION) WITH PYURIA: ICD-10-CM

## 2021-05-17 DIAGNOSIS — N20.1 URETERAL STONE: ICD-10-CM

## 2021-05-17 LAB
ALBUMIN SERPL-MCNC: 3.4 G/DL (ref 3.4–5)
ALBUMIN UR-MCNC: 10 MG/DL
ALP SERPL-CCNC: 103 U/L (ref 40–150)
ALT SERPL W P-5'-P-CCNC: 27 U/L (ref 0–50)
ANION GAP SERPL CALCULATED.3IONS-SCNC: 3 MMOL/L (ref 3–14)
APPEARANCE UR: CLEAR
AST SERPL W P-5'-P-CCNC: 13 U/L (ref 0–45)
BACTERIA #/AREA URNS HPF: ABNORMAL /HPF
BASOPHILS # BLD AUTO: 0.1 10E9/L (ref 0–0.2)
BASOPHILS NFR BLD AUTO: 0.8 %
BILIRUB SERPL-MCNC: 0.3 MG/DL (ref 0.2–1.3)
BILIRUB UR QL STRIP: NEGATIVE
BUN SERPL-MCNC: 18 MG/DL (ref 7–30)
CALCIUM SERPL-MCNC: 8.8 MG/DL (ref 8.5–10.1)
CHLORIDE SERPL-SCNC: 106 MMOL/L (ref 94–109)
CO2 SERPL-SCNC: 27 MMOL/L (ref 20–32)
COLOR UR AUTO: YELLOW
CREAT SERPL-MCNC: 0.61 MG/DL (ref 0.52–1.04)
DIFFERENTIAL METHOD BLD: ABNORMAL
EOSINOPHIL # BLD AUTO: 0.2 10E9/L (ref 0–0.7)
EOSINOPHIL NFR BLD AUTO: 1.4 %
ERYTHROCYTE [DISTWIDTH] IN BLOOD BY AUTOMATED COUNT: 18.6 % (ref 10–15)
GFR SERPL CREATININE-BSD FRML MDRD: >90 ML/MIN/{1.73_M2}
GLUCOSE BLDC GLUCOMTR-MCNC: 144 MG/DL (ref 70–99)
GLUCOSE BLDC GLUCOMTR-MCNC: 153 MG/DL (ref 70–99)
GLUCOSE SERPL-MCNC: 174 MG/DL (ref 70–99)
GLUCOSE UR STRIP-MCNC: NEGATIVE MG/DL
HCT VFR BLD AUTO: 38.2 % (ref 35–47)
HGB BLD-MCNC: 12 G/DL (ref 11.7–15.7)
HGB UR QL STRIP: ABNORMAL
IMM GRANULOCYTES # BLD: 0.1 10E9/L (ref 0–0.4)
IMM GRANULOCYTES NFR BLD: 0.5 %
KETONES UR STRIP-MCNC: 20 MG/DL
LABORATORY COMMENT REPORT: NORMAL
LEUKOCYTE ESTERASE UR QL STRIP: ABNORMAL
LYMPHOCYTES # BLD AUTO: 2.6 10E9/L (ref 0.8–5.3)
LYMPHOCYTES NFR BLD AUTO: 19.4 %
MCH RBC QN AUTO: 26 PG (ref 26.5–33)
MCHC RBC AUTO-ENTMCNC: 31.4 G/DL (ref 31.5–36.5)
MCV RBC AUTO: 83 FL (ref 78–100)
MONOCYTES # BLD AUTO: 0.9 10E9/L (ref 0–1.3)
MONOCYTES NFR BLD AUTO: 7.1 %
MUCOUS THREADS #/AREA URNS LPF: PRESENT /LPF
NEUTROPHILS # BLD AUTO: 9.4 10E9/L (ref 1.6–8.3)
NEUTROPHILS NFR BLD AUTO: 70.8 %
NITRATE UR QL: NEGATIVE
NRBC # BLD AUTO: 0 10*3/UL
NRBC BLD AUTO-RTO: 0 /100
PH UR STRIP: 5 PH (ref 5–7)
PLATELET # BLD AUTO: 505 10E9/L (ref 150–450)
POTASSIUM SERPL-SCNC: 4 MMOL/L (ref 3.4–5.3)
PROT SERPL-MCNC: 7.6 G/DL (ref 6.8–8.8)
RBC # BLD AUTO: 4.61 10E12/L (ref 3.8–5.2)
RBC #/AREA URNS AUTO: 151 /HPF (ref 0–2)
SARS-COV-2 RNA RESP QL NAA+PROBE: NEGATIVE
SODIUM SERPL-SCNC: 136 MMOL/L (ref 133–144)
SOURCE: ABNORMAL
SP GR UR STRIP: 1.02 (ref 1–1.03)
SPECIMEN SOURCE: NORMAL
SQUAMOUS #/AREA URNS AUTO: 1 /HPF (ref 0–1)
UROBILINOGEN UR STRIP-MCNC: NORMAL MG/DL (ref 0–2)
WBC # BLD AUTO: 13.2 10E9/L (ref 4–11)
WBC #/AREA URNS AUTO: 7 /HPF (ref 0–5)

## 2021-05-17 PROCEDURE — 250N000009 HC RX 250: Performed by: UROLOGY

## 2021-05-17 PROCEDURE — 250N000011 HC RX IP 250 OP 636: Performed by: UROLOGY

## 2021-05-17 PROCEDURE — 74420 UROGRAPHY RTRGR +-KUB: CPT | Mod: 26 | Performed by: UROLOGY

## 2021-05-17 PROCEDURE — 250N000011 HC RX IP 250 OP 636: Performed by: NURSE ANESTHETIST, CERTIFIED REGISTERED

## 2021-05-17 PROCEDURE — 96375 TX/PRO/DX INJ NEW DRUG ADDON: CPT | Mod: 59

## 2021-05-17 PROCEDURE — 250N000009 HC RX 250: Performed by: NURSE ANESTHETIST, CERTIFIED REGISTERED

## 2021-05-17 PROCEDURE — 258N000003 HC RX IP 258 OP 636: Performed by: INTERNAL MEDICINE

## 2021-05-17 PROCEDURE — 250N000011 HC RX IP 250 OP 636: Performed by: EMERGENCY MEDICINE

## 2021-05-17 PROCEDURE — 99285 EMERGENCY DEPT VISIT HI MDM: CPT | Mod: 25

## 2021-05-17 PROCEDURE — 96361 HYDRATE IV INFUSION ADD-ON: CPT

## 2021-05-17 PROCEDURE — 81001 URINALYSIS AUTO W/SCOPE: CPT | Performed by: EMERGENCY MEDICINE

## 2021-05-17 PROCEDURE — 250N000011 HC RX IP 250 OP 636: Performed by: INTERNAL MEDICINE

## 2021-05-17 PROCEDURE — C2617 STENT, NON-COR, TEM W/O DEL: HCPCS | Performed by: UROLOGY

## 2021-05-17 PROCEDURE — G0378 HOSPITAL OBSERVATION PER HR: HCPCS

## 2021-05-17 PROCEDURE — 999N000141 HC STATISTIC PRE-PROCEDURE NURSING ASSESSMENT: Performed by: UROLOGY

## 2021-05-17 PROCEDURE — 999N001017 HC STATISTIC GLUCOSE BY METER IP

## 2021-05-17 PROCEDURE — C9803 HOPD COVID-19 SPEC COLLECT: HCPCS

## 2021-05-17 PROCEDURE — 96376 TX/PRO/DX INJ SAME DRUG ADON: CPT

## 2021-05-17 PROCEDURE — 250N000011 HC RX IP 250 OP 636

## 2021-05-17 PROCEDURE — 99204 OFFICE O/P NEW MOD 45 MIN: CPT | Mod: 25 | Performed by: UROLOGY

## 2021-05-17 PROCEDURE — 710N000009 HC RECOVERY PHASE 1, LEVEL 1, PER MIN: Performed by: UROLOGY

## 2021-05-17 PROCEDURE — 99220 PR INITIAL OBSERVATION CARE,LEVEL III: CPT | Performed by: INTERNAL MEDICINE

## 2021-05-17 PROCEDURE — 87635 SARS-COV-2 COVID-19 AMP PRB: CPT | Performed by: INTERNAL MEDICINE

## 2021-05-17 PROCEDURE — 74177 CT ABD & PELVIS W/CONTRAST: CPT

## 2021-05-17 PROCEDURE — 360N000083 HC SURGERY LEVEL 3 W/ FLUORO, PER MIN: Performed by: UROLOGY

## 2021-05-17 PROCEDURE — 370N000017 HC ANESTHESIA TECHNICAL FEE, PER MIN: Performed by: UROLOGY

## 2021-05-17 PROCEDURE — 96365 THER/PROPH/DIAG IV INF INIT: CPT

## 2021-05-17 PROCEDURE — 272N000001 HC OR GENERAL SUPPLY STERILE: Performed by: UROLOGY

## 2021-05-17 PROCEDURE — 255N000002 HC RX 255 OP 636: Performed by: UROLOGY

## 2021-05-17 PROCEDURE — 52332 CYSTOSCOPY AND TREATMENT: CPT | Mod: RT | Performed by: UROLOGY

## 2021-05-17 PROCEDURE — 999N000179 XR SURGERY CARM FLUORO LESS THAN 5 MIN W STILLS: Mod: TC

## 2021-05-17 PROCEDURE — 250N000013 HC RX MED GY IP 250 OP 250 PS 637: Performed by: UROLOGY

## 2021-05-17 PROCEDURE — 96375 TX/PRO/DX INJ NEW DRUG ADDON: CPT

## 2021-05-17 PROCEDURE — 250N000009 HC RX 250

## 2021-05-17 PROCEDURE — 85025 COMPLETE CBC W/AUTO DIFF WBC: CPT | Performed by: EMERGENCY MEDICINE

## 2021-05-17 PROCEDURE — C1769 GUIDE WIRE: HCPCS | Performed by: UROLOGY

## 2021-05-17 PROCEDURE — 80053 COMPREHEN METABOLIC PANEL: CPT | Performed by: EMERGENCY MEDICINE

## 2021-05-17 PROCEDURE — 250N000013 HC RX MED GY IP 250 OP 250 PS 637: Performed by: ANESTHESIOLOGY

## 2021-05-17 DEVICE — STENT URETERAL POLARIS ULTRA 6FRX24CM M0061921320: Type: IMPLANTABLE DEVICE | Site: URETHRA | Status: FUNCTIONAL

## 2021-05-17 RX ORDER — ACETAMINOPHEN 325 MG/1
325 TABLET ORAL EVERY 4 HOURS PRN
Status: DISCONTINUED | OUTPATIENT
Start: 2021-05-17 | End: 2021-05-18 | Stop reason: HOSPADM

## 2021-05-17 RX ORDER — KETOROLAC TROMETHAMINE 30 MG/ML
30 INJECTION, SOLUTION INTRAMUSCULAR; INTRAVENOUS EVERY 6 HOURS PRN
Status: DISCONTINUED | OUTPATIENT
Start: 2021-05-17 | End: 2021-05-17 | Stop reason: HOSPADM

## 2021-05-17 RX ORDER — ONDANSETRON 4 MG/1
4 TABLET, ORALLY DISINTEGRATING ORAL EVERY 6 HOURS PRN
Status: DISCONTINUED | OUTPATIENT
Start: 2021-05-17 | End: 2021-05-18 | Stop reason: HOSPADM

## 2021-05-17 RX ORDER — FENTANYL CITRATE 50 UG/ML
25-50 INJECTION, SOLUTION INTRAMUSCULAR; INTRAVENOUS
Status: DISCONTINUED | OUTPATIENT
Start: 2021-05-17 | End: 2021-05-17

## 2021-05-17 RX ORDER — ONDANSETRON 2 MG/ML
4 INJECTION INTRAMUSCULAR; INTRAVENOUS ONCE
Status: COMPLETED | OUTPATIENT
Start: 2021-05-17 | End: 2021-05-17

## 2021-05-17 RX ORDER — NALOXONE HYDROCHLORIDE 0.4 MG/ML
0.2 INJECTION, SOLUTION INTRAMUSCULAR; INTRAVENOUS; SUBCUTANEOUS
Status: DISCONTINUED | OUTPATIENT
Start: 2021-05-17 | End: 2021-05-18 | Stop reason: HOSPADM

## 2021-05-17 RX ORDER — FENTANYL CITRATE 50 UG/ML
25-50 INJECTION, SOLUTION INTRAMUSCULAR; INTRAVENOUS
Status: DISCONTINUED | OUTPATIENT
Start: 2021-05-17 | End: 2021-05-17 | Stop reason: HOSPADM

## 2021-05-17 RX ORDER — ATROPA BELLADONNA AND OPIUM 16.2; 6 MG/1; MG/1
SUPPOSITORY RECTAL PRN
Status: DISCONTINUED | OUTPATIENT
Start: 2021-05-17 | End: 2021-05-17 | Stop reason: HOSPADM

## 2021-05-17 RX ORDER — OXYCODONE HYDROCHLORIDE 5 MG/1
5-10 TABLET ORAL EVERY 6 HOURS PRN
Status: DISCONTINUED | OUTPATIENT
Start: 2021-05-17 | End: 2021-05-18 | Stop reason: HOSPADM

## 2021-05-17 RX ORDER — SODIUM CHLORIDE, SODIUM LACTATE, POTASSIUM CHLORIDE, CALCIUM CHLORIDE 600; 310; 30; 20 MG/100ML; MG/100ML; MG/100ML; MG/100ML
INJECTION, SOLUTION INTRAVENOUS CONTINUOUS
Status: DISCONTINUED | OUTPATIENT
Start: 2021-05-17 | End: 2021-05-17

## 2021-05-17 RX ORDER — FENTANYL CITRATE 50 UG/ML
INJECTION, SOLUTION INTRAMUSCULAR; INTRAVENOUS PRN
Status: DISCONTINUED | OUTPATIENT
Start: 2021-05-17 | End: 2021-05-17

## 2021-05-17 RX ORDER — ONDANSETRON 4 MG/1
4 TABLET, ORALLY DISINTEGRATING ORAL EVERY 30 MIN PRN
Status: DISCONTINUED | OUTPATIENT
Start: 2021-05-17 | End: 2021-05-17 | Stop reason: HOSPADM

## 2021-05-17 RX ORDER — KETOROLAC TROMETHAMINE 15 MG/ML
15 INJECTION, SOLUTION INTRAMUSCULAR; INTRAVENOUS ONCE
Status: COMPLETED | OUTPATIENT
Start: 2021-05-17 | End: 2021-05-17

## 2021-05-17 RX ORDER — HYDROMORPHONE HYDROCHLORIDE 1 MG/ML
.3-.5 INJECTION, SOLUTION INTRAMUSCULAR; INTRAVENOUS; SUBCUTANEOUS EVERY 10 MIN PRN
Status: DISCONTINUED | OUTPATIENT
Start: 2021-05-17 | End: 2021-05-17 | Stop reason: HOSPADM

## 2021-05-17 RX ORDER — HYDRALAZINE HYDROCHLORIDE 20 MG/ML
2.5-5 INJECTION INTRAMUSCULAR; INTRAVENOUS EVERY 10 MIN PRN
Status: DISCONTINUED | OUTPATIENT
Start: 2021-05-17 | End: 2021-05-17 | Stop reason: HOSPADM

## 2021-05-17 RX ORDER — CEFTRIAXONE 1 G/50ML
1 INJECTION, SOLUTION INTRAVENOUS ONCE
Status: DISCONTINUED | OUTPATIENT
Start: 2021-05-17 | End: 2021-05-17

## 2021-05-17 RX ORDER — PHENYLEPHRINE HYDROCHLORIDE 10 MG/ML
INJECTION INTRAVENOUS PRN
Status: DISCONTINUED | OUTPATIENT
Start: 2021-05-17 | End: 2021-05-17

## 2021-05-17 RX ORDER — GLYCOPYRROLATE 0.2 MG/ML
INJECTION, SOLUTION INTRAMUSCULAR; INTRAVENOUS PRN
Status: DISCONTINUED | OUTPATIENT
Start: 2021-05-17 | End: 2021-05-17

## 2021-05-17 RX ORDER — SODIUM CHLORIDE, SODIUM LACTATE, POTASSIUM CHLORIDE, CALCIUM CHLORIDE 600; 310; 30; 20 MG/100ML; MG/100ML; MG/100ML; MG/100ML
INJECTION, SOLUTION INTRAVENOUS CONTINUOUS
Status: DISCONTINUED | OUTPATIENT
Start: 2021-05-17 | End: 2021-05-17 | Stop reason: HOSPADM

## 2021-05-17 RX ORDER — IOPAMIDOL 755 MG/ML
500 INJECTION, SOLUTION INTRAVASCULAR ONCE
Status: COMPLETED | OUTPATIENT
Start: 2021-05-17 | End: 2021-05-17

## 2021-05-17 RX ORDER — PROPOFOL 10 MG/ML
INJECTION, EMULSION INTRAVENOUS PRN
Status: DISCONTINUED | OUTPATIENT
Start: 2021-05-17 | End: 2021-05-17

## 2021-05-17 RX ORDER — CEFDINIR 300 MG/1
300 CAPSULE ORAL 2 TIMES DAILY
Status: ON HOLD | COMMUNITY
Start: 2021-05-11 | End: 2021-05-18

## 2021-05-17 RX ORDER — PROCHLORPERAZINE 25 MG
25 SUPPOSITORY, RECTAL RECTAL EVERY 12 HOURS PRN
Status: DISCONTINUED | OUTPATIENT
Start: 2021-05-17 | End: 2021-05-18 | Stop reason: HOSPADM

## 2021-05-17 RX ORDER — ACETAMINOPHEN 325 MG/1
975 TABLET ORAL ONCE
Status: COMPLETED | OUTPATIENT
Start: 2021-05-17 | End: 2021-05-17

## 2021-05-17 RX ORDER — MORPHINE SULFATE 4 MG/ML
4 INJECTION, SOLUTION INTRAMUSCULAR; INTRAVENOUS ONCE
Status: COMPLETED | OUTPATIENT
Start: 2021-05-17 | End: 2021-05-17

## 2021-05-17 RX ORDER — LIDOCAINE HYDROCHLORIDE 20 MG/ML
INJECTION, SOLUTION INFILTRATION; PERINEURAL PRN
Status: DISCONTINUED | OUTPATIENT
Start: 2021-05-17 | End: 2021-05-17

## 2021-05-17 RX ORDER — CEFAZOLIN SODIUM 2 G/100ML
2 INJECTION, SOLUTION INTRAVENOUS
Status: DISCONTINUED | OUTPATIENT
Start: 2021-05-17 | End: 2021-05-18 | Stop reason: HOSPADM

## 2021-05-17 RX ORDER — NALOXONE HYDROCHLORIDE 0.4 MG/ML
0.4 INJECTION, SOLUTION INTRAMUSCULAR; INTRAVENOUS; SUBCUTANEOUS
Status: DISCONTINUED | OUTPATIENT
Start: 2021-05-17 | End: 2021-05-18 | Stop reason: HOSPADM

## 2021-05-17 RX ORDER — METOPROLOL TARTRATE 1 MG/ML
1-2 INJECTION, SOLUTION INTRAVENOUS EVERY 5 MIN PRN
Status: DISCONTINUED | OUTPATIENT
Start: 2021-05-17 | End: 2021-05-17 | Stop reason: HOSPADM

## 2021-05-17 RX ORDER — SODIUM CHLORIDE, SODIUM LACTATE, POTASSIUM CHLORIDE, CALCIUM CHLORIDE 600; 310; 30; 20 MG/100ML; MG/100ML; MG/100ML; MG/100ML
INJECTION, SOLUTION INTRAVENOUS CONTINUOUS
Status: DISCONTINUED | OUTPATIENT
Start: 2021-05-17 | End: 2021-05-18 | Stop reason: HOSPADM

## 2021-05-17 RX ORDER — DIMENHYDRINATE 50 MG/ML
25 INJECTION, SOLUTION INTRAMUSCULAR; INTRAVENOUS
Status: DISCONTINUED | OUTPATIENT
Start: 2021-05-17 | End: 2021-05-17 | Stop reason: HOSPADM

## 2021-05-17 RX ORDER — CEFAZOLIN SODIUM 2 G/100ML
2 INJECTION, SOLUTION INTRAVENOUS SEE ADMIN INSTRUCTIONS
Status: DISCONTINUED | OUTPATIENT
Start: 2021-05-17 | End: 2021-05-18 | Stop reason: HOSPADM

## 2021-05-17 RX ORDER — ONDANSETRON 2 MG/ML
4 INJECTION INTRAMUSCULAR; INTRAVENOUS EVERY 6 HOURS PRN
Status: DISCONTINUED | OUTPATIENT
Start: 2021-05-17 | End: 2021-05-18 | Stop reason: HOSPADM

## 2021-05-17 RX ORDER — ONDANSETRON 2 MG/ML
INJECTION INTRAMUSCULAR; INTRAVENOUS PRN
Status: DISCONTINUED | OUTPATIENT
Start: 2021-05-17 | End: 2021-05-17

## 2021-05-17 RX ORDER — PROCHLORPERAZINE MALEATE 10 MG
10 TABLET ORAL EVERY 6 HOURS PRN
Status: DISCONTINUED | OUTPATIENT
Start: 2021-05-17 | End: 2021-05-18 | Stop reason: HOSPADM

## 2021-05-17 RX ORDER — HYDROMORPHONE HYDROCHLORIDE 1 MG/ML
0.3 INJECTION, SOLUTION INTRAMUSCULAR; INTRAVENOUS; SUBCUTANEOUS
Status: DISCONTINUED | OUTPATIENT
Start: 2021-05-17 | End: 2021-05-18 | Stop reason: HOSPADM

## 2021-05-17 RX ORDER — MEPERIDINE HYDROCHLORIDE 25 MG/ML
12.5 INJECTION INTRAMUSCULAR; INTRAVENOUS; SUBCUTANEOUS
Status: DISCONTINUED | OUTPATIENT
Start: 2021-05-17 | End: 2021-05-17 | Stop reason: HOSPADM

## 2021-05-17 RX ORDER — KETOROLAC TROMETHAMINE 15 MG/ML
15 INJECTION, SOLUTION INTRAMUSCULAR; INTRAVENOUS ONCE
Status: DISCONTINUED | OUTPATIENT
Start: 2021-05-17 | End: 2021-05-17

## 2021-05-17 RX ORDER — ONDANSETRON 2 MG/ML
4 INJECTION INTRAMUSCULAR; INTRAVENOUS EVERY 30 MIN PRN
Status: DISCONTINUED | OUTPATIENT
Start: 2021-05-17 | End: 2021-05-17 | Stop reason: HOSPADM

## 2021-05-17 RX ORDER — ONDANSETRON 2 MG/ML
INJECTION INTRAMUSCULAR; INTRAVENOUS
Status: COMPLETED
Start: 2021-05-17 | End: 2021-05-17

## 2021-05-17 RX ORDER — DEXAMETHASONE SODIUM PHOSPHATE 4 MG/ML
INJECTION, SOLUTION INTRA-ARTICULAR; INTRALESIONAL; INTRAMUSCULAR; INTRAVENOUS; SOFT TISSUE PRN
Status: DISCONTINUED | OUTPATIENT
Start: 2021-05-17 | End: 2021-05-17

## 2021-05-17 RX ORDER — CEFTRIAXONE 1 G/1
1 INJECTION, POWDER, FOR SOLUTION INTRAMUSCULAR; INTRAVENOUS ONCE
Status: COMPLETED | OUTPATIENT
Start: 2021-05-17 | End: 2021-05-17

## 2021-05-17 RX ORDER — ACETAMINOPHEN 325 MG/1
650 TABLET ORAL EVERY 4 HOURS PRN
Status: DISCONTINUED | OUTPATIENT
Start: 2021-05-17 | End: 2021-05-17

## 2021-05-17 RX ADMIN — PHENYLEPHRINE HYDROCHLORIDE 200 MCG: 10 INJECTION INTRAVENOUS at 16:36

## 2021-05-17 RX ADMIN — DEXAMETHASONE SODIUM PHOSPHATE 4 MG: 4 INJECTION, SOLUTION INTRA-ARTICULAR; INTRALESIONAL; INTRAMUSCULAR; INTRAVENOUS; SOFT TISSUE at 16:33

## 2021-05-17 RX ADMIN — CEFTRIAXONE 1 G: 1 INJECTION, POWDER, FOR SOLUTION INTRAMUSCULAR; INTRAVENOUS at 09:58

## 2021-05-17 RX ADMIN — GLYCOPYRROLATE 0.2 MG: 0.2 INJECTION, SOLUTION INTRAMUSCULAR; INTRAVENOUS at 16:33

## 2021-05-17 RX ADMIN — KETOROLAC TROMETHAMINE 15 MG: 15 INJECTION, SOLUTION INTRAMUSCULAR; INTRAVENOUS at 01:52

## 2021-05-17 RX ADMIN — FENTANYL CITRATE 100 MCG: 50 INJECTION, SOLUTION INTRAMUSCULAR; INTRAVENOUS at 16:33

## 2021-05-17 RX ADMIN — ONDANSETRON HYDROCHLORIDE 4 MG: 2 INJECTION, SOLUTION INTRAVENOUS at 16:51

## 2021-05-17 RX ADMIN — ONDANSETRON 4 MG: 2 INJECTION INTRAMUSCULAR; INTRAVENOUS at 19:41

## 2021-05-17 RX ADMIN — ONDANSETRON 4 MG: 4 TABLET, ORALLY DISINTEGRATING ORAL at 13:33

## 2021-05-17 RX ADMIN — OXYCODONE HYDROCHLORIDE 10 MG: 5 TABLET ORAL at 19:44

## 2021-05-17 RX ADMIN — SODIUM CHLORIDE, POTASSIUM CHLORIDE, SODIUM LACTATE AND CALCIUM CHLORIDE: 600; 310; 30; 20 INJECTION, SOLUTION INTRAVENOUS at 12:34

## 2021-05-17 RX ADMIN — ONDANSETRON 4 MG: 2 INJECTION INTRAMUSCULAR; INTRAVENOUS at 04:01

## 2021-05-17 RX ADMIN — ACETAMINOPHEN 325 MG: 325 TABLET, FILM COATED ORAL at 13:34

## 2021-05-17 RX ADMIN — ONDANSETRON 4 MG: 2 INJECTION INTRAMUSCULAR; INTRAVENOUS at 09:12

## 2021-05-17 RX ADMIN — MORPHINE SULFATE 4 MG: 4 INJECTION INTRAVENOUS at 04:03

## 2021-05-17 RX ADMIN — PROPOFOL 200 MG: 10 INJECTION, EMULSION INTRAVENOUS at 16:33

## 2021-05-17 RX ADMIN — IOPAMIDOL 100 ML: 755 INJECTION, SOLUTION INTRAVENOUS at 04:36

## 2021-05-17 RX ADMIN — HYDROMORPHONE HYDROCHLORIDE 0.3 MG: 1 INJECTION, SOLUTION INTRAMUSCULAR; INTRAVENOUS; SUBCUTANEOUS at 13:34

## 2021-05-17 RX ADMIN — CEFAZOLIN SODIUM 2 G: 2 INJECTION, SOLUTION INTRAVENOUS at 16:26

## 2021-05-17 RX ADMIN — MORPHINE SULFATE 4 MG: 4 INJECTION INTRAVENOUS at 05:31

## 2021-05-17 RX ADMIN — ACETAMINOPHEN 975 MG: 325 TABLET, FILM COATED ORAL at 17:29

## 2021-05-17 RX ADMIN — LIDOCAINE HYDROCHLORIDE 50 MG: 20 INJECTION, SOLUTION INFILTRATION; PERINEURAL at 16:33

## 2021-05-17 RX ADMIN — MIDAZOLAM 2 MG: 1 INJECTION INTRAMUSCULAR; INTRAVENOUS at 16:26

## 2021-05-17 ASSESSMENT — ENCOUNTER SYMPTOMS
BACK PAIN: 1
ABDOMINAL PAIN: 1
VOMITING: 1
HEMATURIA: 0
FEVER: 0
DYSURIA: 0

## 2021-05-17 ASSESSMENT — MIFFLIN-ST. JEOR: SCORE: 1621.43

## 2021-05-17 NOTE — H&P (VIEW-ONLY)
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    Abdominal pain secondary to right-sided kidney stone  - Patient presented with acute onset of right-sided abdominal pain.  CT of the abdomen/pelvis shows 4 mm proximal right ureteral calculus with associated moderate hydronephrosis.  - Urology plan for ureteral stent placement.  - Keep n.p.o., maintenance IV fluids, antiemetics and pain medication as needed.    Complicated UTI  - Patient was diagnosed with cystitis as an outpatient on 5/11.  She had symptoms of dysuria and hematuria at that time.  Those symptoms have resolved.  Urine culture from the urgent care visit grew pan susceptible E. coli.  She has been taking cefdinir twice daily for last 5 days.  - Ceftriaxone IV while admitted.  - Would extend course an additional 5 days on discharge with ciprofloxacin given this represents a complicated UTI as there is an associated kidney stone.    Soft tissue density in the cul-de-sac, exophytic uterine lesion  - Patient reports that these are previously known to her.  She says she had a traumatic splenic rupture in the past and that she previously had these areas biopsied and they were found to be basically ectopic implants of splenic tissue.  No further work-up indicated as an inpatient.  She can discuss these findings with her outpatient gynecologist if she wishes.    Medical conditions  HTN, HLD, and DM 2-all of her home medications will be held while she is admitted observation and n.p.o.    Diet: NPO  DVT Prophylaxis: Low Risk/Ambulatory with no VTE prophylaxis indicated  Marinelli Catheter: No  Code Status: Full Code    Disposition Plan   Expected  "discharge:   Pending on timing of urologic procedure, discharge to home tonight or tomorrow.    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    Patient says that last week, she developed symptoms consistent with UTI for her with some bloody urine and frequency with discomfort.  She was seen in urgent care and prescribed cefdinir.  Was doing okay until yesterday, 5/16 when she developed right-sided lower abdominal pain radiating to the back.  It felt similar to her previous kidney stones, so she decided to go the ED.  In the ED, found to have obstructing right proximal ureteral calculus with associated right sided mild hydronephrosis.  Urology contacted with plans for ureteral stent placement.  Patient mated observation pending timing of the procedure.  At the time of interview, patient says she is feeling a little bit better than she did when she came in.  Pain medication seems to be helping.  Having a little bit of nausea.  No fever/chills.  No chest pain or shortness of breath.  She is fully vaccinated against COVID-19.    Review of Systems    The 10 point Review of Systems is negative other than noted in the HPI or here.     Physical Exam   BP (!) 143/51 (BP Location: Left arm)   Pulse 80   Temp 97.4  F (36.3  C) (Oral)   Resp 18   Ht 1.6 m (5' 3\")   Wt 107.2 kg (236 lb 6.4 oz)   SpO2 94%   BMI 41.88 kg/m         General: appears mildly uncomfortable but no acute distress.    HEENT: No scleral icterus. Oropharynx moist.     Neck: Supple.    Pulmonary: Normal work of breathing. Clear to auscultation bilaterally.    Cardiovascular: Regular rate and " rhythm without murmur or extra heart sounds.    Abdomen: Soft and non-tender.    Extremities: No peripheral edema. No clubbing or cyanosis.     Neurologic: Awake, alert, appropriate.    Skin: Warm and dry.    Psychiatric: Normal affect and mood.     Data   Data reviewed today: I have reviewed all labs and imaging results.    Recent Labs   Lab 05/17/21  0148   WBC 13.2*   HGB 12.0   MCV 83   *      POTASSIUM 4.0   CHLORIDE 106   CO2 27   BUN 18   CR 0.61   ANIONGAP 3   ELPIDIO 8.8   *   ALBUMIN 3.4   PROTTOTAL 7.6   BILITOTAL 0.3   ALKPHOS 103   ALT 27   AST 13     Recent Results (from the past 24 hour(s))   CT Abdomen Pelvis w Contrast    Addendum: 5/17/2021    ADDENDUM:     2 x 1.5 cm fatty lesion right adnexa likely a small ovarian dermoid.        Narrative    EXAM: CT ABDOMEN PELVIS W CONTRAST  LOCATION: [facility]  DATE/TIME: 5/17/2021 4:29 AM    INDICATION: Flank pain, kidney stone suspected  RLQ abdominal pain, appendicitis suspected (Age >= 14y)  COMPARISON: None.  TECHNIQUE: CT scan of the abdomen and pelvis was performed following injection of IV contrast. Multiplanar reformats were obtained. Dose reduction techniques were used.  CONTRAST: 100 ml isovue 370    FINDINGS:   LOWER CHEST: Lung bases clear.    HEPATOBILIARY: Hepatic steatosis. Gallbladder is distended. Cholelithiasis.    PANCREAS: Normal.    SPLEEN: Absent.    ADRENAL GLANDS: Normal.    KIDNEYS/BLADDER: Nonobstructing left renal calculi measuring up to 3 mm. Tiny right renal calculi. Mild to moderate right hydronephrosis. 4 mm proximal right ureteral calculus.    BOWEL: Normal caliber. Normal appendix.    LYMPH NODES: Normal.    VASCULATURE: Unremarkable.    PELVIC ORGANS: 1.6 x 1.8 cm exophytic lesion uterine fundus. Nodular soft tissue lesions in the cul-de-sac. The largest series 3 image 195.2 x 2.1 cm. These are similar density to the uterine lesion.    MUSCULOSKELETAL: Degenerative change osseous structures.  Density ventral subcutaneous tissue of the pelvis questionably chronic, scarring.      Impression    IMPRESSION:   1.  4 mm proximal right ureteral calculus. Mild right hydronephrosis.  2.  Cholelithiasis and mild gallbladder distention.  3.  Bilateral renal calculi.  4.  1.8 cm exophytic lesion uterine fundus. Nodular soft tissue masslike density in the cul-de-sac is of similar attenuation to the uterine lesion. Uncertain if the uterine lesion is an exophytic fibroid or if both the uterine and cul-de-sac lesions are   endometrial implants. Is there a history of endometriosis? Comparison to any previous imaging recommended. If none available consider nonurgent pelvic MR follow-up.       Past Medical History    I have reviewed this patient's medical history and updated it with pertinent information if needed.   Past Medical History:   Diagnosis Date     Diabetes mellitus (H)      H/O splenectomy      Hyperlipidemia      Hypertension      IFG (impaired fasting glucose)      Kidney stones      Splenosis      Urinary incontinence         Past Surgical History    I have reviewed this patient's surgical history and updated it with pertinent information if needed.  Past Surgical History:   Procedure Laterality Date     ABDOMEN SURGERY      laparotomy( colon nick ), repair liver bleed,spleenectomy     COLONOSCOPY       DILATION AND CURETTAGE, OPERATIVE HYSTEROSCOPY, COMBINED N/A 9/5/2018    Procedure: COMBINED DILATION AND CURETTAGE, OPERATIVE HYSTEROSCOPY;  COMBINED DILATION AND CURETTAGE, HYSTEROSCOPY ;  Surgeon: April Lara MD;  [facility]: [facility]     GYN SURGERY      c. section     HERNIA REPAIR      ventral hernia     ORTHOPEDIC SURGERY      ankle fracture repair     spleenectomy          Social History    I have reviewed this patient's social history and updated it with pertinent information if needed.  Social History     Tobacco Use     Smoking status: Never Smoker     Smokeless tobacco: Never Used    Substance Use Topics     Alcohol use: Yes     Comment: 1/month     Drug use: No          Family History    I have reviewed this patient's family history and updated it with pertinent information if needed.   Not pertinent to current condition.    Prior to Admission Medications    Prior to Admission Medications   Prescriptions Last Dose Informant Patient Reported? Taking?   Acetaminophen (TYLENOL PO)   Yes No   Sig: Take  by mouth.   HYDROcodone-acetaminophen (NORCO) 5-325 MG per tablet   No No   Sig: Take 1-2 tablets by mouth every 4 hours as needed for other (Moderate to Severe Pain)   IBUPROFEN PO   Yes No   Sig: Take  by mouth.   MetFORMIN HCl (GLUCOPHAGE XR PO)   Yes No   Sig: Take 500 mg by mouth daily   SIMVASTATIN PO   Yes No   Sig: Take 20 mg by mouth daily    Tolterodine Tartrate (DETROL LA PO)   Yes No   Sig: Take 4 mg by mouth daily   lisinopril (LISINOPRIL PO)   Yes No   Sig: Take 20 mg by mouth daily       Facility-Administered Medications: None        Allergies    No Known Allergies

## 2021-05-17 NOTE — CONSULTS
[facility] Consultation by [facility]    Estela Menjivar MRN# [id number]   Age: 60 year old YOB: 1960     Date of Admission:  5/17/2021    Reason for consult: Nephrolithiasis       Requesting PA/MD: Dr. Lin       Level of consult: Consult, follow and place orders           Assessment and Plan:   Assessment:   4 mm right ureteral stone  Right hydronephrosis  UTI  Fatty lesion      Plan:   -Continue NPO  -plan to OR today for cystoscopy, right ureteral stent placement this afternoon/evening.  -Plan on admitting to observation.  Patient may need to stay overnight based upon timing and infection.  -IVF fluids  -Flomax 0.4mg QHS - monitor for orthostatic hypotension.   -Strain urine.  -We discussed possible side effects with an indwelling ureteral stent such as urgency and frequency of urination, dysuria, hematuria, symptoms of urine reflux, and some achiness in the side. Indwelling ureteral stents need to be exchanged every three months or removed by three months.   -She will need definitive stone management with cystoscopy, right ureteroscopy, right laser lithotripsy and basketing, and right ureteral stent exchange in several weeks when UTI has cleared.    -Will need follow up with primary or gyn about implants in the pelvis.  May be related to history of ruptured spleen.  -Thank you for involving us in the care of this patient. We will continue to follow along.     Mary Taylor PA-C   [facility]  [phone]               Chief Complaint:   Nephrolithiasis     History is obtained from the patient and EMR.         History of Present Illness:   This patient is a 60 year old female who presents with abdominal and back pain.  It started last evening around 2130.  Patient tried to take Advil, but promptly vomited it back up.  She has not eaten any food since 1800 last night.  She endorses nausea and vomiting due to severe pain.  This has improved.  Her pain is currently managed.   She has been given Toradol and morphine.  She also had a dose of Zofran.    Patient underwent CT imaging which shows a 4 mm right ureteral stone with moderate hydronephrosis.  Patient was recently started on cefdinir for a urinary tract infection.  Urinalysis showed significant hematuria at that time.  Urine culture showed 50,000 100,000 CFU's per mL of pansensitive E. coli.  Patient still has 4 days left of her cefdinir.  With her urinary tract infection, she had urgency, frequency, hematuria, and dysuria.  She notes that these have resolved.    Patient denies any fevers or chills.  She denies any shortness of breath or chest pain.  She had an episode of nephrolithiasis approximately 6 to 7 years ago.  She was able to pass it on her own.  No family history of nephrolithiasis.  Evidence of leukocytosis at 13.2.  Creatinine 0.61 with a EGFR of greater than 90.  No evidence of tachycardia.  Afebrile 98.8.  Urinalysis is not particularly convincing for infection today, but she is still on antibiotic for culture proven urinary tract infection.      Allergies:  The patient has no known allergies.      Medications:  Norco  Lisinopril  Glucophage  Simvastatin  Detrol La PO  Deltasone  Flonase  Zestril  Zocor  Metformin     Past Medical History:    Hyperlipidemia  Hypertension  IFG  Kidney stones  Splenosis  Post-menopausal bleeding   Type II Diabetes  Pneumonia  Obesity  Anemia     Past Surgical History:    Abdomen surgery  Colonoscopy  Dilation and curettage    Hernia repair  Ankle fracture repair  Splenectomy      Family History:    Diabetes  Hypertension  No family history of nephrolithiasis.     Social History:  Patient has never smoked.    .         Review of Systems:   A comprehensive 10-point review of systems was performed and found to be negative except as described in the HPI.     /78   Pulse 85   Temp 98.8  F (37.1  C) (Temporal)   Resp 20   SpO2 95%   PSYCH: NAD  EYES: EOMI  MOUTH:  MMM  NECK: Supple, no notable adenopathy  RESP: Unlabored breathing, RA  CARDIAC: No LE edema  SKIN: Warm, no rashes  ABD: Undistended, Nontender  NEURO: AAO x3         Data:     Lab Results   Component Value Date    WBC 13.2 (H) 05/17/2021    HGB 12.0 05/17/2021    HCT 38.2 05/17/2021    MCV 83 05/17/2021     (H) 05/17/2021     Lab Results   Component Value Date    CR 0.61 05/17/2021 5/11/21: Urinalysis shows cloudy red urine with >100 RBC and 26-50 WBC.                Urine culture shows 50,000-100,000 CFU/ml E. Coli; pansensitive    Color Urine (no units)   Date Value   05/17/2021 Yellow     Appearance Urine (no units)   Date Value   05/17/2021 Clear     Glucose Urine (mg/dL)   Date Value   05/17/2021 Negative     Bilirubin Urine (no units)   Date Value   05/17/2021 Negative     Ketones Urine (mg/dL)   Date Value   05/17/2021 20 (A)     Specific Gravity Urine (no units)   Date Value   05/17/2021 1.025     pH Urine (pH)   Date Value   05/17/2021 5.0     Protein Albumin Urine (mg/dL)   Date Value   05/17/2021 10 (A)     Nitrite Urine (no units)   Date Value   05/17/2021 Negative     Leukocyte Esterase Urine (no units)   Date Value   05/17/2021 Small (A)     Ct Abdomen Pelvis W Contrast    Addendum Date: 5/17/2021    ADDENDUM: 2 x 1.5 cm fatty lesion right adnexa likely a small ovarian dermoid.     Result Date: 5/17/2021  EXAM: CT ABDOMEN PELVIS W CONTRAST LOCATION: [facility] DATE/TIME: 5/17/2021 4:29 AM INDICATION: Flank pain, kidney stone suspected RLQ abdominal pain, appendicitis suspected (Age >= 14y) COMPARISON: None. TECHNIQUE: CT scan of the abdomen and pelvis was performed following injection of IV contrast. Multiplanar reformats were obtained. Dose reduction techniques were used. CONTRAST: 100 ml isovue 370 FINDINGS: LOWER CHEST: Lung bases clear. HEPATOBILIARY: Hepatic steatosis. Gallbladder is distended. Cholelithiasis. PANCREAS: Normal. SPLEEN: Absent. ADRENAL GLANDS: Normal.  KIDNEYS/BLADDER: Nonobstructing left renal calculi measuring up to 3 mm. Tiny right renal calculi. Mild to moderate right hydronephrosis. 4 mm proximal right ureteral calculus. BOWEL: Normal caliber. Normal appendix. LYMPH NODES: Normal. VASCULATURE: Unremarkable. PELVIC ORGANS: 1.6 x 1.8 cm exophytic lesion uterine fundus. Nodular soft tissue lesions in the cul-de-sac. The largest series 3 image 195.2 x 2.1 cm. These are similar density to the uterine lesion. MUSCULOSKELETAL: Degenerative change osseous structures. Density ventral subcutaneous tissue of the pelvis questionably chronic, scarring.     IMPRESSION: 1.  4 mm proximal right ureteral calculus. Mild right hydronephrosis. 2.  Cholelithiasis and mild gallbladder distention. 3.  Bilateral renal calculi. 4.  1.8 cm exophytic lesion uterine fundus. Nodular soft tissue masslike density in the cul-de-sac is of similar attenuation to the uterine lesion. Uncertain if the uterine lesion is an exophytic fibroid or if both the uterine and cul-de-sac lesions are endometrial implants. Is there a history of endometriosis? Comparison to any previous imaging recommended. If none available consider nonurgent pelvic MR follow-up.

## 2021-05-17 NOTE — ED PROVIDER NOTES
History   Chief Complaint:  Abdominal Pain and Back Pain       HPI   Estela Menjivar is a 60 year old female with history of kidney stones, type II diabetes, obesity, hyperlipidemia, and hypertension who presents with abdominal pain and back pain. She says that starting last night around  she began having right lower abdominal pains that would radiate to her back. She tried using Advil with no alleviation of her pain. She has had kidney stones before and says it is possibly another. She additionally complains of vomiting twice but said that could have been due to the pain.    She is currently undergoing antibiotic treatment as of 5 days ago for a UTI. At the time, she had dysuria, hematuria, urgency, but no fever. She does not complain of any of those symptoms at the moment.     Review of Systems   Constitutional: Negative for fever.   Gastrointestinal: Positive for abdominal pain (right lower) and vomiting.   Genitourinary: Negative for dysuria and hematuria.   Musculoskeletal: Positive for back pain (right lower).   All other systems reviewed and are negative.      Allergies:  The patient has no known allergies.     Medications:  Norco  Lisinopril  Glucophage  Simvastatin  Detrol La PO  Deltasone  Flonase  Zestril  Zocor  Metformin    Past Medical History:    Hyperlipidemia  Hypertension  IFG  Kidney stones  Splenosis  Post-menopausal bleeding   Type II Diabetes  Pneumonia  Obesity  Anemia    Past Surgical History:    Abdomen surgery  Colonoscopy  Dilation and curettage    Hernia repair  Ankle fracture repair  Splenectomy     Family History:    Diabetes  Hypertension    Social History:  Patient is accompanied by her .  Patient came from home.    Physical Exam     Patient Vitals for the past 24 hrs:   BP Temp Temp src Pulse Resp SpO2   21 0600 120/72 -- -- 74 -- 96 %   21 0545 -- -- -- -- -- 92 %   21 0530 128/88 -- -- 82 -- 96 %   21 0515 (!) 121/96 -- -- 81 -- 95 %    05/17/21 0500 137/79 -- -- 81 -- 96 %   05/17/21 0415 122/82 -- -- 67 -- 94 %   05/17/21 0400 126/85 -- -- 75 -- 98 %   05/17/21 0105 92/72 98.8  F (37.1  C) Temporal 85 20 94 %       Physical Exam    Eyes: No discharge, symmetrical lids  ENT: Moist mucous membranes, no ear discharge  Neck: Full range of motion  Respiratory: CTAB, no wheezes  Cardiovascular: Regular rate and rhythm, no lower extremity edema  Chest: Equal rise  Gastrointestinal: Soft. Nondistended. RLQ TTP. No rebound or guarding  Musculoskeletal: No gross deformities.   Skin: Warm and well perfused. No visible rash.  Neurologic: Moves all extremities, speech fluent without dysarthria  Psychiatric: Appropriate affect, alert and interactive        Emergency Department Course     Imaging:    CT Abdomen Pelvis w Contrast  1.  4 mm proximal right ureteral calculus. Mild right hydronephrosis.  2.  Cholelithiasis and mild gallbladder distention.  3.  Bilateral renal calculi.  4.  1.8 cm exophytic lesion uterine fundus. Nodular soft tissue masslike density in the cul-de-sac is of similar attenuation to the uterine lesion. Uncertain if the uterine lesion is an exophytic fibroid or if both the uterine and cul-de-sac lesions are endometrial implants. Is there a history of endometriosis? Comparison to any previous imaging recommended. If none available consider nonurgent pelvic MR follow-up.    Addendum:  2 x 1.5 cm fatty lesion right adnexa likely a small ovarian dermoid.    Reading per radiology    Laboratory:    CBC: WBC 13.2 (H), HGB 12.0,  (H)     CMP: Glucose 174 (H), o/w WNL (Creatinine 0.61)     UA with microscopic: K[place] 20 (A), Blood moderate (A), Protein Albumin 10 (A), Leukocyte Esterase small (A), WBC/HPF 7 (H), RBC/ (H), Bacteria few (A), Mucous present (A), o/w WNL     Emergency Department Course:    Reviewed:  I reviewed nursing notes, vitals, past medical history and care everywhere    Assessments:  0319 I obtained history and  examined the patient as noted above.   0532 I rechecked the patient and explained findings.     Consults:   0516 I consulted with Dr. Michelle of Urology and discussed patient findings and asked about possibility and treatment for an infected kidney stone.    Interventions:  0152 Toradol 15 mg IV  0401 Zofran 4 mg IV  0403 Morphine 4 mg IV  0403 Toradol 15 mg IV  0531 Morphine 4 mg IV    Disposition:  Care of the patient was transferred to my colleague Dr. Lin pending Urology.       Impression & Plan     Medical Decision MakinF h/o prior kidney stone presenting with R lateral abdominal pain radiating to R flank.  Has been on cefdinir since  for UTI, diagnosed at outside urgent care.  Differential diagnosis includes but is not limited to kidney stone, infected stone, pyelonephritis, appendicitis  CT obtained, showing a 4 mm proximal ureteral stone.  Also demonstrates several incidental findings, adnexal lesion, uterus lesion -- discussed these with pt -- she reports she is aware of these lesions, and they are related to fragments of splenic tissue that have embedded in her pelvic organs, as a sequelae of a traumatic splenic rupture requiring splenectomy remotely, and that she is currently being monitored for these.  She is being treated for UTI with cefdinir since .  UA tonight is not grossly infected.  Discussed case with urology, regarding question possible infected stone as she is currently being treated for UTI.  Discussed with Dr. Michelle, who will consult on the pt in the ED.  Care of pt signed out to Dr. Lin pending urology consultation.       Diagnosis:  No diagnosis found.    Discharge Medications:  New Prescriptions    No medications on file       Scribe Disclosure:  MARIAH, Cristo Joy, am serving as a scribe at 3:14 AM on 2021 to document services personally performed by Misha Friedman MD based on my observations and the provider's statements to me.              Misha Friedman MD  21  [id number]

## 2021-05-17 NOTE — ANESTHESIA CARE TRANSFER NOTE
Patient: Estela Menjivar    Procedure(s):  CYSTOSCOPY, WITH RIGHT URETERAL STENT INSERTION    Diagnosis: Ureteral stone [N20.1]  Diagnosis Additional Information: No value filed.    Anesthesia Type:   General     Note:      Level of Consciousness: awake  Oxygen Supplementation: face mask    Independent Airway: airway patency satisfactory and stable  Dentition: dentition unchanged  Vital Signs Stable: post-procedure vital signs reviewed and stable  Report to RN Given: handoff report given  Patient transferred to: PACU    Handoff Report: Identifed the Patient, Identified the Reponsible Provider, Reviewed the pertinent medical history, Discussed the surgical course, Reviewed Intra-OP anesthesia mangement and issues during anesthesia, Set expectations for post-procedure period and Allowed opportunity for questions and acknowledgement of understanding      Vitals: (Last set prior to Anesthesia Care Transfer)  CRNA VITALS  5/17/2021 1624 - 5/17/2021 1658      5/17/2021             Pulse:  82    SpO2:  97 %    Resp Rate (observed):  9        Electronically Signed By: MARIFER Zarate CRNA  May 17, 2021  4:58 PM

## 2021-05-17 NOTE — H&P
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    Abdominal pain secondary to right-sided kidney stone  - Patient presented with acute onset of right-sided abdominal pain.  CT of the abdomen/pelvis shows 4 mm proximal right ureteral calculus with associated moderate hydronephrosis.  - Urology plan for ureteral stent placement.  - Keep n.p.o., maintenance IV fluids, antiemetics and pain medication as needed.    Complicated UTI  - Patient was diagnosed with cystitis as an outpatient on 5/11.  She had symptoms of dysuria and hematuria at that time.  Those symptoms have resolved.  Urine culture from the urgent care visit grew pan susceptible E. coli.  She has been taking cefdinir twice daily for last 5 days.  - Ceftriaxone IV while admitted.  - Would extend course an additional 5 days on discharge with ciprofloxacin given this represents a complicated UTI as there is an associated kidney stone.    Soft tissue density in the cul-de-sac, exophytic uterine lesion  - Patient reports that these are previously known to her.  She says she had a traumatic splenic rupture in the past and that she previously had these areas biopsied and they were found to be basically ectopic implants of splenic tissue.  No further work-up indicated as an inpatient.  She can discuss these findings with her outpatient gynecologist if she wishes.    Medical conditions  HTN, HLD, and DM 2-all of her home medications will be held while she is admitted observation and n.p.o.    Diet: NPO  DVT Prophylaxis: Low Risk/Ambulatory with no VTE prophylaxis indicated  Marinelli Catheter: No  Code Status: Full Code    Disposition Plan   Expected  "discharge:   Pending on timing of urologic procedure, discharge to home tonight or tomorrow.    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    Patient says that last week, she developed symptoms consistent with UTI for her with some bloody urine and frequency with discomfort.  She was seen in urgent care and prescribed cefdinir.  Was doing okay until yesterday, 5/16 when she developed right-sided lower abdominal pain radiating to the back.  It felt similar to her previous kidney stones, so she decided to go the ED.  In the ED, found to have obstructing right proximal ureteral calculus with associated right sided mild hydronephrosis.  Urology contacted with plans for ureteral stent placement.  Patient mated observation pending timing of the procedure.  At the time of interview, patient says she is feeling a little bit better than she did when she came in.  Pain medication seems to be helping.  Having a little bit of nausea.  No fever/chills.  No chest pain or shortness of breath.  She is fully vaccinated against COVID-19.    Review of Systems    The 10 point Review of Systems is negative other than noted in the HPI or here.     Physical Exam   BP (!) 143/51 (BP Location: Left arm)   Pulse 80   Temp 97.4  F (36.3  C) (Oral)   Resp 18   Ht 1.6 m (5' 3\")   Wt 107.2 kg (236 lb 6.4 oz)   SpO2 94%   BMI 41.88 kg/m         General: appears mildly uncomfortable but no acute distress.    HEENT: No scleral icterus. Oropharynx moist.     Neck: Supple.    Pulmonary: Normal work of breathing. Clear to auscultation bilaterally.    Cardiovascular: Regular rate and " rhythm without murmur or extra heart sounds.    Abdomen: Soft and non-tender.    Extremities: No peripheral edema. No clubbing or cyanosis.     Neurologic: Awake, alert, appropriate.    Skin: Warm and dry.    Psychiatric: Normal affect and mood.     Data   Data reviewed today: I have reviewed all labs and imaging results.    Recent Labs   Lab 05/17/21  0148   WBC 13.2*   HGB 12.0   MCV 83   *      POTASSIUM 4.0   CHLORIDE 106   CO2 27   BUN 18   CR 0.61   ANIONGAP 3   ELPIDIO 8.8   *   ALBUMIN 3.4   PROTTOTAL 7.6   BILITOTAL 0.3   ALKPHOS 103   ALT 27   AST 13     Recent Results (from the past 24 hour(s))   CT Abdomen Pelvis w Contrast    Addendum: 5/17/2021    ADDENDUM:     2 x 1.5 cm fatty lesion right adnexa likely a small ovarian dermoid.        Narrative    EXAM: CT ABDOMEN PELVIS W CONTRAST  LOCATION: [facility]  DATE/TIME: 5/17/2021 4:29 AM    INDICATION: Flank pain, kidney stone suspected  RLQ abdominal pain, appendicitis suspected (Age >= 14y)  COMPARISON: None.  TECHNIQUE: CT scan of the abdomen and pelvis was performed following injection of IV contrast. Multiplanar reformats were obtained. Dose reduction techniques were used.  CONTRAST: 100 ml isovue 370    FINDINGS:   LOWER CHEST: Lung bases clear.    HEPATOBILIARY: Hepatic steatosis. Gallbladder is distended. Cholelithiasis.    PANCREAS: Normal.    SPLEEN: Absent.    ADRENAL GLANDS: Normal.    KIDNEYS/BLADDER: Nonobstructing left renal calculi measuring up to 3 mm. Tiny right renal calculi. Mild to moderate right hydronephrosis. 4 mm proximal right ureteral calculus.    BOWEL: Normal caliber. Normal appendix.    LYMPH NODES: Normal.    VASCULATURE: Unremarkable.    PELVIC ORGANS: 1.6 x 1.8 cm exophytic lesion uterine fundus. Nodular soft tissue lesions in the cul-de-sac. The largest series 3 image 195.2 x 2.1 cm. These are similar density to the uterine lesion.    MUSCULOSKELETAL: Degenerative change osseous structures.  Density ventral subcutaneous tissue of the pelvis questionably chronic, scarring.      Impression    IMPRESSION:   1.  4 mm proximal right ureteral calculus. Mild right hydronephrosis.  2.  Cholelithiasis and mild gallbladder distention.  3.  Bilateral renal calculi.  4.  1.8 cm exophytic lesion uterine fundus. Nodular soft tissue masslike density in the cul-de-sac is of similar attenuation to the uterine lesion. Uncertain if the uterine lesion is an exophytic fibroid or if both the uterine and cul-de-sac lesions are   endometrial implants. Is there a history of endometriosis? Comparison to any previous imaging recommended. If none available consider nonurgent pelvic MR follow-up.       Past Medical History    I have reviewed this patient's medical history and updated it with pertinent information if needed.   Past Medical History:   Diagnosis Date     Diabetes mellitus (H)      H/O splenectomy      Hyperlipidemia      Hypertension      IFG (impaired fasting glucose)      Kidney stones      Splenosis      Urinary incontinence         Past Surgical History    I have reviewed this patient's surgical history and updated it with pertinent information if needed.  Past Surgical History:   Procedure Laterality Date     ABDOMEN SURGERY      laparotomy( colon nick ), repair liver bleed,spleenectomy     COLONOSCOPY       DILATION AND CURETTAGE, OPERATIVE HYSTEROSCOPY, COMBINED N/A 9/5/2018    Procedure: COMBINED DILATION AND CURETTAGE, OPERATIVE HYSTEROSCOPY;  COMBINED DILATION AND CURETTAGE, HYSTEROSCOPY ;  Surgeon: April Lara MD;  Location: [facility]     GYN SURGERY      c. section     HERNIA REPAIR      ventral hernia     ORTHOPEDIC SURGERY      ankle fracture repair     spleenectomy          Social History    I have reviewed this patient's social history and updated it with pertinent information if needed.  Social History     Tobacco Use     Smoking status: Never Smoker     Smokeless tobacco: Never Used    Substance Use Topics     Alcohol use: Yes     Comment: 1/month     Drug use: No          Family History    I have reviewed this patient's family history and updated it with pertinent information if needed.   Not pertinent to current condition.    Prior to Admission Medications    Prior to Admission Medications   Prescriptions Last Dose Informant Patient Reported? Taking?   Acetaminophen (TYLENOL PO)   Yes No   Sig: Take  by mouth.   HYDROcodone-acetaminophen (NORCO) 5-325 MG per tablet   No No   Sig: Take 1-2 tablets by mouth every 4 hours as needed for other (Moderate to Severe Pain)   IBUPROFEN PO   Yes No   Sig: Take  by mouth.   MetFORMIN HCl (GLUCOPHAGE XR PO)   Yes No   Sig: Take 500 mg by mouth daily   SIMVASTATIN PO   Yes No   Sig: Take 20 mg by mouth daily    Tolterodine Tartrate (DETROL LA PO)   Yes No   Sig: Take 4 mg by mouth daily   lisinopril (LISINOPRIL PO)   Yes No   Sig: Take 20 mg by mouth daily       Facility-Administered Medications: None        Allergies    No Known Allergies

## 2021-05-17 NOTE — PROGRESS NOTES
Urology    Stent placed in OR  Re-admit to obs  If remains afebrile, can discharge this evening with an additional week of cephalosporin antibiotics

## 2021-05-17 NOTE — ANESTHESIA POSTPROCEDURE EVALUATION
Patient: Estela Menjivar    Procedure(s):  CYSTOSCOPY, WITH RIGHT URETERAL STENT INSERTION    Diagnosis:Ureteral stone [N20.1]  Diagnosis Additional Information: No value filed.    Anesthesia Type:  General    Note:     Postop Pain Control: Uneventful            Sign Out: Well controlled pain   PONV: No   Neuro/Psych: Uneventful            Sign Out: Acceptable/Baseline neuro status   Airway/Respiratory: Uneventful            Sign Out: Acceptable/Baseline resp. status   CV/Hemodynamics: Uneventful            Sign Out: Acceptable CV status; No obvious hypovolemia; No obvious fluid overload   Other NRE: NONE   DID A NON-ROUTINE EVENT OCCUR? No           Last vitals:  Vitals:    05/17/21 1700 05/17/21 1715 05/17/21 1730   BP: 121/86 135/73 135/73   Pulse: 103 78 69   Resp: 15 12 9   Temp:      SpO2: 98% 97% 95%       Last vitals prior to Anesthesia Care Transfer:  CRNA VITALS  5/17/2021 1624 - 5/17/2021 1724      5/17/2021             Pulse:  82    SpO2:  97 %    Resp Rate (observed):  9          Electronically Signed By: Joe Aldridge MD  May 17, 2021  5:56 PM

## 2021-05-17 NOTE — PLAN OF CARE
ROOM # 215    Living Situation (if not independent, order SW consult): Home with   Facility name:  : [phone]    Activity level at baseline: Independent  Activity level on admit: SBA      Patient registered to observation; given Patient Bill of Rights; given the opportunity to ask questions about observation status and their plan of care.  Patient has been oriented to the observation room, bathroom and call light is in place.    Discussed discharge goals and expectations with patient/family.

## 2021-05-17 NOTE — ED NOTES
[facility]  ED Nurse Handoff Report    Estela Menjivar is a 60 year old female   ED Chief complaint: Abdominal Pain and Back Pain  . ED Diagnosis:   Final diagnoses:   UTI (urinary tract infection) with pyuria   Renal colic     Allergies: No Known Allergies    Code Status: Full Code  Activity level - Baseline/Home:  Independent. Activity Level - Current:   Stand by Assist. Lift room needed: No. Bariatric: No   Needed: No   Isolation: No. Infection: Not Applicable.     Vital Signs:   Vitals:    05/17/21 0915 05/17/21 0930 05/17/21 0945 05/17/21 1000   BP: 121/73 109/68  113/73   Pulse: 87 72  80   Resp:       Temp:       TempSrc:       SpO2: 96% 94% 91% 96%       Cardiac Rhythm:  ,      Pain level:    Patient confused: No. Patient Falls Risk: Yes.   Elimination Status: Has voided   Patient Report - Initial Complaint: right flank. Focused Assessment:  Genitourinary - Genitourinary WDL: all   Genitourinary -  Signs and Symptoms: flank pain (right-associated N/V-right abd pain)  Tests Performed: lab/imaging. Abnormal Results:   Labs Ordered and Resulted from Time of ED Arrival Up to the Time of Departure from the ED   CBC WITH PLATELETS DIFFERENTIAL - Abnormal; Notable for the following components:       Result Value    WBC 13.2 (*)     MCH 26.0 (*)     MCHC 31.4 (*)     RDW 18.6 (*)     Platelet Count 505 (*)     Absolute Neutrophil 9.4 (*)     All other components within normal limits   COMPREHENSIVE METABOLIC PANEL - Abnormal; Notable for the following components:    Glucose 174 (*)     All other components within normal limits   ROUTINE UA WITH MICROSCOPIC - Abnormal; Notable for the following components:    Ketones Urine 20 (*)     Blood Urine Moderate (*)     Protein Albumin Urine 10 (*)     Leukocyte Esterase Urine Small (*)     WBC Urine 7 (*)     RBC Urine 151 (*)     Bacteria Urine Few (*)     Mucous Urine Present (*)     All other components within normal limits       .    Treatments provided: MAR  Family Comments: spouse present  OBS brochure/video discussed/provided to patient:  Yes  ED Medications:   Medications   ketorolac (TORADOL) injection 15 mg (15 mg Intravenous Not Given 5/17/21 0403)   ketorolac (TORADOL) injection 15 mg (15 mg Intravenous Given 5/17/21 0152)   morphine (PF) injection 4 mg (4 mg Intravenous Given 5/17/21 0403)   ondansetron (ZOFRAN) injection 4 mg (4 mg Intravenous Given 5/17/21 0401)   sodium chloride (PF) 0.9% PF flush 100 mL (65 mLs Intravenous Given 5/17/21 0437)   iopamidol (ISOVUE-370) solution 500 mL (100 mLs Intravenous Given 5/17/21 0436)   morphine (PF) injection 4 mg (4 mg Intravenous Given 5/17/21 0531)   ondansetron (ZOFRAN) injection 4 mg (4 mg Intravenous Given 5/17/21 0912)   cefTRIAXone (ROCEPHIN) 1 g vial to attach to  mL bag for ADULTS or NS 50 mL bag for PEDS (1 g Intravenous New Bag 5/17/21 0958)     Drips infusing:  No  For the majority of the shift, the patient's behavior Green. Interventions performed were .    Sepsis treatment initiated: No     Patient tested for COVID 19 prior to admission: YES    ED Nurse Name/Phone Number: Yodit Escamilla RN,   10:30 AM    RECEIVING UNIT ED HANDOFF REVIEW    Above ED Nurse Handoff Report was reviewed: Yes  Reviewed by: Nhung Kim RN on May 17, 2021 at 10:38 AM

## 2021-05-17 NOTE — ED TRIAGE NOTES
Pt arrives with complaints of RLQ abdominal pain that moves lateral into R flank and R low back pain. Pt is in the midst of treatment for a UTI diagnosed on Tuesday. Denies urinary symptoms currently, and denies diarrhea/constipation. Pt reports she has felt gassy all day and she also had an episode of nausea and emesis earlier after trying to take IBU. ABCs intact, A/O x4.

## 2021-05-17 NOTE — ANESTHESIA PREPROCEDURE EVALUATION
Anesthesia Pre-Procedure Evaluation    Patient: Estela Menjivar   MRN: [id number] : 1960        Preoperative Diagnosis: Ureteral stone [N20.1]   Procedure : Procedure(s):  CYSTOSCOPY, WITH RIGHT URETERAL STENT INSERTION     Past Medical History:   Diagnosis Date     Diabetes mellitus (H)      H/O splenectomy      Hyperlipidemia      Hypertension      IFG (impaired fasting glucose)      Kidney stones      Splenosis      Urinary incontinence       Past Surgical History:   Procedure Laterality Date     ABDOMEN SURGERY      laparotomy( colon nick ), repair liver bleed,spleenectomy     COLONOSCOPY       DILATION AND CURETTAGE, OPERATIVE HYSTEROSCOPY, COMBINED N/A 2018    Procedure: COMBINED DILATION AND CURETTAGE, OPERATIVE HYSTEROSCOPY;  COMBINED DILATION AND CURETTAGE, HYSTEROSCOPY ;  Surgeon: April Lara MD;  Location: [facility]     GYN SURGERY      c. section     HERNIA REPAIR      ventral hernia     ORTHOPEDIC SURGERY      ankle fracture repair     spleenectomy        No Known Allergies   Social History     Tobacco Use     Smoking status: Never Smoker     Smokeless tobacco: Never Used   Substance Use Topics     Alcohol use: Yes     Comment: 1/month      Wt Readings from Last 1 Encounters:   21 107.2 kg (236 lb 6.4 oz)        Anesthesia Evaluation   Pt has had prior anesthetic. Type: General.    No history of anesthetic complications       ROS/MED HX  ENT/Pulmonary:     (+) MONSE risk factors, hypertension, obese,     Neurologic:  - neg neurologic ROS     Cardiovascular:     (+) Dyslipidemia hypertension-----    METS/Exercise Tolerance:     Hematologic:  - neg hematologic  ROS     Musculoskeletal:  - neg musculoskeletal ROS     GI/Hepatic:  - neg GI/hepatic ROS     Renal/Genitourinary:     (+) Nephrolithiasis ,     Endo:     (+) type II DM,     Psychiatric/Substance Use:  - neg psychiatric ROS     Infectious Disease:       Malignancy:       Other:            Physical Exam    Airway         Mallampati: II   TM distance: > 3 FB   Neck ROM: full   Mouth opening: > 3 cm    Respiratory Devices and Support         Dental  no notable dental history         Cardiovascular   cardiovascular exam normal          Pulmonary   pulmonary exam normal                OUTSIDE LABS:  CBC:   Lab Results   Component Value Date    WBC 13.2 (H) 05/17/2021    WBC 13.0 (H) 01/05/2012    HGB 12.0 05/17/2021    HGB 11.5 (L) 01/05/2012    HCT 38.2 05/17/2021    HCT 36.8 01/05/2012     (H) 05/17/2021     (H) 01/05/2012     BMP:   Lab Results   Component Value Date     05/17/2021     01/05/2012    POTASSIUM 4.0 05/17/2021    POTASSIUM 4.0 01/05/2012    CHLORIDE 106 05/17/2021    CHLORIDE 103 01/05/2012    CO2 27 05/17/2021    CO2 26 01/05/2012    BUN 18 05/17/2021    BUN 13 01/05/2012    CR 0.61 05/17/2021    CR 0.58 01/05/2012     (H) 05/17/2021     (H) 01/05/2012     COAGS: No results found for: PTT, INR, FIBR  POC: No results found for: BGM, HCG, HCGS  HEPATIC:   Lab Results   Component Value Date    ALBUMIN 3.4 05/17/2021    PROTTOTAL 7.6 05/17/2021    ALT 27 05/17/2021    AST 13 05/17/2021    ALKPHOS 103 05/17/2021    BILITOTAL 0.3 05/17/2021     OTHER:   Lab Results   Component Value Date    ELPIDIO 8.8 05/17/2021       Anesthesia Plan    ASA Status:  3   NPO Status:  NPO Appropriate    Anesthesia Type: General.     - Airway: LMA   Induction: Intravenous.   Maintenance: Inhalation.        Consents    Anesthesia Plan(s) and associated risks, benefits, and realistic alternatives discussed. Questions answered and patient/representative(s) expressed understanding.     - Discussed with:  Patient      - Extended Intubation/Ventilatory Support Discussed: No.      - Patient is DNR/DNI Status: No    Use of blood products discussed: No .     Postoperative Care    Pain management: IV analgesics.   PONV prophylaxis: Ondansetron (or other 5HT-3), Dexamethasone or Solumedrol     Comments:                 Filipe Felipe MD

## 2021-05-17 NOTE — PHARMACY-ADMISSION MEDICATION HISTORY
Admission medication history interview status for this patient is complete. See Psychiatric admission navigator for allergy information, prior to admission medications and immunization status.     Medication history interview done, indicate source(s): Patient via phone  Medication history resources (including written lists, pill bottles, clinic record): [employer] dispense records  Pharmacy: n/a    Changes made to PTA medication list:  Added: cefdinir  Deleted: hydrocodone/apap  Changed: metformin 500mg --> 1000mg daily    Actions taken by pharmacist (provider contacted, etc):None     Additional medication history information:None    Medication reconciliation/reorder completed by provider prior to medication history?  N   (Y/N)         Prior to Admission medications    Medication Sig Last Dose Taking? Auth Provider   cefdinir (OMNICEF) 300 MG capsule Take 300 mg by mouth 2 times daily 5/16/2021 at pm Yes Unknown, Entered By History   lisinopril (ZESTRIL) 20 MG tablet Take 20 mg by mouth daily  5/16/2021 at am Yes Reported, Patient   metFORMIN (GLUCOPHAGE XR) 500 MG 24 hr tablet Take 1,000 mg by mouth daily  5/16/2021 at am Yes Reported, Patient   simvastatin (ZOCOR) 20 MG tablet Take 20 mg by mouth daily  5/16/2021 at am Yes Reported, Patient   tolterodine ER (DETROL LA) 4 MG 24 hr capsule Take 4 mg by mouth daily  5/16/2021 at am Yes Reported, Patient   Acetaminophen (TYLENOL PO) Take 500-1,000 mg by mouth every 6 hours as needed  Unknown at Unknown time  Reported, Patient   ibuprofen (ADVIL/MOTRIN) 200 MG tablet Take 200-400 mg by mouth every 6 hours as needed  Unknown at Unknown time  Reported, Patient

## 2021-05-18 ENCOUNTER — TELEPHONE (OUTPATIENT)
Dept: UROLOGY | Facility: CLINIC | Age: 61
End: 2021-05-18

## 2021-05-18 VITALS
SYSTOLIC BLOOD PRESSURE: 138 MMHG | HEIGHT: 63 IN | BODY MASS INDEX: 42.54 KG/M2 | RESPIRATION RATE: 16 BRPM | DIASTOLIC BLOOD PRESSURE: 75 MMHG | OXYGEN SATURATION: 93 % | TEMPERATURE: 96.7 F | WEIGHT: 240.1 LBS | HEART RATE: 88 BPM

## 2021-05-18 DIAGNOSIS — N20.1 URETERAL STONE: Primary | ICD-10-CM

## 2021-05-18 PROCEDURE — 99212 OFFICE O/P EST SF 10 MIN: CPT | Performed by: PHYSICIAN ASSISTANT

## 2021-05-18 PROCEDURE — G0378 HOSPITAL OBSERVATION PER HR: HCPCS

## 2021-05-18 PROCEDURE — 99217 PR OBSERVATION CARE DISCHARGE: CPT | Performed by: PHYSICIAN ASSISTANT

## 2021-05-18 RX ORDER — CEFDINIR 300 MG/1
300 CAPSULE ORAL 2 TIMES DAILY
Qty: 6 CAPSULE | Refills: 0 | Status: SHIPPED | OUTPATIENT
Start: 2021-05-18 | End: 2021-05-21

## 2021-05-18 RX ORDER — TAMSULOSIN HYDROCHLORIDE 0.4 MG/1
0.4 CAPSULE ORAL DAILY
Qty: 21 CAPSULE | Refills: 0 | Status: SHIPPED | OUTPATIENT
Start: 2021-05-18 | End: 2021-06-08

## 2021-05-18 ASSESSMENT — MIFFLIN-ST. JEOR: SCORE: 1638.22

## 2021-05-18 NOTE — TELEPHONE ENCOUNTER
Had surgery yesterday  Called at 4:55 to report a fever of 99.4 . Suggested she take some Acetaminophen to reduce fever  . If she spikes a high fever or feels ill  Instructed to go to ED .

## 2021-05-18 NOTE — PLAN OF CARE
"PRIMARY DIAGNOSIS CYSTOSCOPY, WITH RIGHT URETERAL STENT INSERTION  OUTPATIENT/OBSERVATION GOALS TO BE MET BEFORE DISCHARGE:  1. Stable vital signs Yes  2. Tolerating diet:Yes, regular diet  3. Pain controlled with oral pain medications:  Yes  4. Positive bowel sounds:  Yes  5. Voiding without difficulty:  Yes  6. Able to ambulate:  Yes  7. Provider specific discharge goals met:  No     Blood pressure (!) 149/67, pulse 67, temperature 97.3  F (36.3  C), temperature source Oral, resp. rate 16, height 1.6 m (5' 3\"), weight 107.2 kg (236 lb 6.4 oz), SpO2 90 %.    Patient returning from PACU, s/p cystoscopy with right ureteral stent insertion at approximately 19:09.  LS clear, adequate sats on 2 liters nasal cannula. ETCO2  38, IPI 7.  Zofran given for c/o of nausea and oxycodone 10 mgs given for complaints of post procedure discomfort.   Patient later tolerating liquids, offered regular tray meal items.  Patient is an assist of one with gait belt, ambulating to the bathroom prn.  Plan:  Continue to provide supportive cares.      Discharge Planner Nurse   Safe discharge environment identified: Yes  Barriers to discharge: Yes,unless medically cleared       Entered by: Karen M. Lesch 05/17/2021 20:00 PM     Please review provider order for any additional goals.   Nurse to notify provider when observation goals have been met and patient is ready for discharge.  "

## 2021-05-18 NOTE — DISCHARGE SUMMARY
[facility]  Hospitalist Discharge Summary      Date of Admission:  5/17/2021  Date of Discharge:  5/18/2021  Discharging Provider: Willa Kraft PA-C      Discharge Diagnoses   Renal colic  UTI  4 mm R ureter calculus   S/p cystoscopy and stent placement    Follow-ups Needed After Discharge   Follow-up Appointments     Follow-up and recommended labs and tests       Follow up with primary care provider, RIGOBERTO TRAYLOR, within 7 days for   hospital follow- up.  No follow up labs or test are needed.  Follow up with Urology for definitive management.   Complete 7 more day of antibiotics, you have 4 days remaining at home, 3   more days filled on discharge.             Unresulted Labs Ordered in the Past 30 Days of this Admission     No orders found for last 31 day(s).      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 ED work up was fairly unremarkable, renal function is normal. WBC mildly elevated at 13.2. The patient was recently dx with UTI and started on antibiotics, her UA remained abnormal. Urology was consulted. Pt was taken to the OR for cystoscopy and stent placement. The patient tolerated this well. She will follow up with Urology in 3 weeks for definitive management. She will discharge on Flomax and 7 days of antibiotics.     Renal colic, right-sided kidney stone  - Patient presented with acute onset of right-sided abdominal pain.  CT of the abdomen/pelvis shows 4 mm proximal right ureteral calculus with associated moderate hydronephrosis.  - Urology consulted for ureteral stent placement.    S/p cystoscopy and R sided stent  placement     Complicated UTI  - Patient was diagnosed with cystitis as an outpatient on 5/11.  She had symptoms of dysuria and hematuria at that time.  Those symptoms have resolved.  Urine culture from the urgent care visit grew pan susceptible E. coli.  She had been taking cefdinir, has 4 days remaining at home.  -complete 7 more days of Omnicef     Soft tissue density in the cul-de-sac, exophytic uterine lesion  - Patient reports that these are previously known to her.  She says she had a traumatic splenic rupture in the past and that she previously had these areas biopsied and they were found to be basically ectopic implants of splenic tissue.  No further work-up indicated as an inpatient.  She can discuss these findings with her outpatient gynecologist if she wishes.    Covid-19 negative      Consultations This Hospital Stay   UROLOGY IP CONSULT    Code Status   Full Code    Time Spent on this Encounter   IWilla PA-C, personally saw the patient today and spent less than or equal to 30 minutes discharging this patient.       Willa Kraft PA-C  [facility] OBSERVATION DEPT  [address] [place] [address]  Phone: [phone]  ______________________________________________________________________    Physical Exam   Vital Signs: Temp: 96.7  F (35.9  C) Temp src: Oral BP: 135/60 Pulse: 86   Resp: 15 SpO2: 94 % O2 Device: Nasal cannula Oxygen Delivery: 2 LPM  Weight: 240 lbs 1.6 oz    GENERAL:  Comfortable.  PSYCH: pleasant, oriented, No acute distress.  HEART:  RRR  LUNGS:  Normal Respiratory effort.   EXTREMITIES:  Able to ambulate independently.  SKIN:  Dry to touch, No rash, wound or ulcerations.  NEUROLOGIC:  Grossly intact       Primary Care Physician   RIGOBERTO TRAYLOR    Discharge Orders      Reason for your hospital stay    Renal colic. Urology consulted and you underwent cystoscopy and stent placement. You also had evidence of a urinary tract infection and  will continue on antibiotics.     Follow-up and recommended labs and tests     Follow up with primary care provider, RIGOBERTO TRAYLOR, within 7 days for hospital follow- up.  No follow up labs or test are needed.  Follow up with Urology for definitive management.   Complete 7 more day of antibiotics, you have 4 days remaining at home, 3 more days filled on discharge.     Activity    Your activity upon discharge: activity as tolerated     When to contact your care team    Call your primary doctor if you have any of the following: temperature greater than 101, increased pain, increased dysuria, persistent hematuria or inability to urinate.     Full Code     Diet    Follow this diet upon discharge: Regular       Significant Results and Procedures   Most Recent 3 CBC's:  Recent Labs   Lab Test 05/17/21 0148   WBC 13.2*   HGB 12.0   MCV 83   *     Most Recent 3 BMP's:  Recent Labs   Lab Test 05/17/21 0148      POTASSIUM 4.0   CHLORIDE 106   CO2 27   BUN 18   CR 0.61   ANIONGAP 3   ELPIDIO 8.8   *     Most Recent 2 LFT's:  Recent Labs   Lab Test 05/17/21 0148   AST 13   ALT 27   ALKPHOS 103   BILITOTAL 0.3     Most Recent 6 Bacteria Isolates From Any Culture (See EPIC Reports for Culture Details):No lab results found.  Most Recent 6 glucoses:  Recent Labs   Lab Test 05/17/21 0148   *     Most Recent Urinalysis:  Recent Labs   Lab Test 05/17/21 0148   COLOR Yellow   APPEARANCE Clear   URINEGLC Negative   URINEBILI Negative   URINEKETONE 20*   SG 1.025   UBLD Moderate*   URINEPH 5.0   PROTEIN 10*   NITRITE Negative   LEUKEST Small*   RBCU 151*   WBCU 7*   ,   Results for orders placed or performed during the hospital encounter of 05/17/21   CT Abdomen Pelvis w Contrast    Addendum: 5/17/2021    ADDENDUM:     2 x 1.5 cm fatty lesion right adnexa likely a small ovarian dermoid.        Narrative    EXAM: CT ABDOMEN PELVIS W CONTRAST  LOCATION: [facility]  DATE/TIME: 5/17/2021 4:29  AM    INDICATION: Flank pain, kidney stone suspected  RLQ abdominal pain, appendicitis suspected (Age >= 14y)  COMPARISON: None.  TECHNIQUE: CT scan of the abdomen and pelvis was performed following injection of IV contrast. Multiplanar reformats were obtained. Dose reduction techniques were used.  CONTRAST: 100 ml isovue 370    FINDINGS:   LOWER CHEST: Lung bases clear.    HEPATOBILIARY: Hepatic steatosis. Gallbladder is distended. Cholelithiasis.    PANCREAS: Normal.    SPLEEN: Absent.    ADRENAL GLANDS: Normal.    KIDNEYS/BLADDER: Nonobstructing left renal calculi measuring up to 3 mm. Tiny right renal calculi. Mild to moderate right hydronephrosis. 4 mm proximal right ureteral calculus.    BOWEL: Normal caliber. Normal appendix.    LYMPH NODES: Normal.    VASCULATURE: Unremarkable.    PELVIC ORGANS: 1.6 x 1.8 cm exophytic lesion uterine fundus. Nodular soft tissue lesions in the cul-de-sac. The largest series 3 image 195.2 x 2.1 cm. These are similar density to the uterine lesion.    MUSCULOSKELETAL: Degenerative change osseous structures. Density ventral subcutaneous tissue of the pelvis questionably chronic, scarring.      Impression    IMPRESSION:   1.  4 mm proximal right ureteral calculus. Mild right hydronephrosis.  2.  Cholelithiasis and mild gallbladder distention.  3.  Bilateral renal calculi.  4.  1.8 cm exophytic lesion uterine fundus. Nodular soft tissue masslike density in the cul-de-sac is of similar attenuation to the uterine lesion. Uncertain if the uterine lesion is an exophytic fibroid or if both the uterine and cul-de-sac lesions are   endometrial implants. Is there a history of endometriosis? Comparison to any previous imaging recommended. If none available consider nonurgent pelvic MR follow-up.   XR Surgery RAMIRO L/T 5 Min Fluoro w Stills    Narrative    This exam was marked as non-reportable because it will not be read by a   radiologist or a [facility] non-radiologist provider.                Discharge Medications   Current Discharge Medication List      START taking these medications    Details   tamsulosin (FLOMAX) 0.4 MG capsule Take 1 capsule (0.4 mg) by mouth daily for 21 days  Qty: 21 capsule, Refills: 0    Associated Diagnoses: Renal colic         CONTINUE these medications which have CHANGED    Details   cefdinir (OMNICEF) 300 MG capsule Take 1 capsule (300 mg) by mouth 2 times daily for 3 days  Qty: 6 capsule, Refills: 0    Associated Diagnoses: UTI (urinary tract infection) with pyuria; Renal colic         CONTINUE these medications which have NOT CHANGED    Details   lisinopril (ZESTRIL) 20 MG tablet Take 20 mg by mouth daily       metFORMIN (GLUCOPHAGE XR) 500 MG 24 hr tablet Take 1,000 mg by mouth daily       simvastatin (ZOCOR) 20 MG tablet Take 20 mg by mouth daily       tolterodine ER (DETROL LA) 4 MG 24 hr capsule Take 4 mg by mouth daily       Acetaminophen (TYLENOL PO) Take 500-1,000 mg by mouth every 6 hours as needed       ibuprofen (ADVIL/MOTRIN) 200 MG tablet Take 200-400 mg by mouth every 6 hours as needed            Allergies   No Known Allergies

## 2021-05-18 NOTE — PLAN OF CARE
PRIMARY DIAGNOSIS: BILIARY COLIC/UNCOMPLICATED EARLY ACUTE CHOLECYSTITIS  OUTPATIENT/OBSERVATION GOALS TO BE MET BEFORE DISCHARGE:    1. Pain status: Pain free.  2. Stable vital signs and labs (if performed) at disposition: Yes  3. Tolerating adequate PO diet: Yes  4. Successful cholecystectomy or clear follow up plan with General Surgery team if immediate surgery not performed Yes  5. ADLs back to baseline?  Yes  6. Activity and level of assistance: Ambulating independently.  7. Barriers to discharge noted No    Discharge Planner Nurse   Safe discharge environment identified: Yes  Barriers to discharge: Yes       Entered by: Jose Marquez 05/18/2021 6:34 AM     Please review provider order for any additional goals.   Nurse to notify provider when observation goals have been met and patient is ready for discharge.

## 2021-05-18 NOTE — OP NOTE
Procedure Date: 05/17/2021    SURGEON:  Jonathan Lezama MD    PREOPERATIVE DIAGNOSIS:  Right ureteral stone, urinary tract infections.    POSTOPERATIVE DIAGNOSIS:  Right ureteral stone, urinary tract infections.    PROCEDURE PERFORMED:  Cystoscopy, right retrograde pyelogram, interpretation of fluoroscopic images, placement of 6 x 24 double-J right ureteral stent.    COMPLICATIONS:  None.    COMPLICATIONS:  None.    PROCEDURE: Estela Buckley is a 52-year-old woman with a mid right ureteral stone and urinary tract infection, now presents for stent placement.    DESCRIPTION OF PROCEDURE:  The risks and benefits of the procedure were explained in detail to patient and informed consent was obtained.  The patient was brought out and placed supine on the table and underwent general endotracheal anesthetic.  She was then moved down to the dorsal lithotomy position.  She was prepped and draped in standard sterile fashion.    Procedure began by introducing the 22-Wolof rigid cystoscope through the urethra into the bladder for cystoscopy.  There were no urothelial abnormalities identified.  The right orifice was identified and cannulated with ureteral catheter.  I performed retrograde pyelogram.  There was hydronephrosis down to the level of the mid ureter.  I passed a Glidewire into the kidney and backed off the ureteral catheter.  I passed a 6 x 24 double-J of sensor wire.  The wire was pulled back and a good curl was seen in the renal pelvis under fluoroscopy.  A good curl was seen in the bladder under direct visualization.  The bladder was drained, scope was removed.  I placed a B and O suppository at the end the case.    The patient tolerated the procedure without complications.  She went post-anesthetic care unit in good condition.  She will go to hospital [facility] for further monitoring from there.  After appropriate antibiotic therapy she to the operating room for stent removal and stone extraction.    Jonathan DUPREE  MD Teja        D: 2021   T: 2021   MT: [email or web address]    Name:     МАРИНА MONGE  MRN:      [id number]        Account:        [id number]   :      1960           Procedure Date: 2021     Document: [id number]

## 2021-05-18 NOTE — PROGRESS NOTES
[facility] Urology Progress Note          Assessment and Plan:     Assessment:    POD 1 Cystoscopy, right     Ureteral stone, right retrograde pyelogram, interpretation of fluoroscopic images, placement of 6 x  24 double-J right ureteral stent.    Renal colic    UTI (urinary tract infection) with pyuria      Plan:   - Plan on discharge home this morning.  -  Patient is on 4 mg Detrol for OAB.  She can continue this at discharge, as it will help with the bladder spasms from the stent.  -  Discharge with Flomax for stent discomfort.  Most common side effect is lightheaded and dizziness.  If this occurs, discontinue.  - Continue with Cefdinir for 7 days.  -We discussed possible side effects with an indwelling ureteral stent such as urgency and frequency of urination, dysuria, hematuria, symptoms of urine reflux, and some achiness in the side. Indwelling ureteral stents need to be exchanged every three months or removed by three months.   -She will need definitive stone management with cystoscopy, right ureteroscopy, right laser lithotripsy and basketing, and right ureteral stent exchange in several weeks when UTI has cleared.  It is currently scheduled for 06/07/21 with Dr. Lezama.  Time not confirmed.    Mary Taylor PA-C   [facility] Urology  [phone]               Interval History:     Doing well and feels better than yesterday; No cp, sob, n/v/d.  Urinating on own.  Has noted some slight hematuria. No dysuria. Baseline is on OAB medication. So acheiness on the right side that is fairly consistent. Eating breakfast. Afebrile.  No tachycardia.  Received IV Rocephin and operative Ancef. On cefdinir for E. Coli UTI, pansensitive, before yesterday.  Has taken 6 days of Abx.              Review of Systems:     The 5 point Review of Systems is negative other than noted in the HPI             Medications:     Current Facility-Administered Medications Ordered in Epic   Medication Dose Route  Frequency Last Rate Last Admin     acetaminophen (TYLENOL) tablet 325 mg  325 mg Oral Q4H PRN   325 mg at 05/17/21 1334     ceFAZolin (ANCEF) intermittent infusion 2 g in 100 mL dextrose PRE-MIX  2 g Intravenous Pre-Op/Pre-procedure x 1 dose         ceFAZolin (ANCEF) intermittent infusion 2 g in 100 mL dextrose PRE-MIX  2 g Intravenous See Admin Instructions   2 g at 05/17/21 1626     HYDROmorphone (PF) (DILAUDID) injection 0.3 mg  0.3 mg Intravenous Q2H PRN   0.3 mg at 05/17/21 1334     lactated ringers infusion   Intravenous Continuous 75 mL/hr at 05/17/21 1234 New Bag at 05/17/21 1234     melatonin tablet 1 mg  1 mg Oral At Bedtime PRN         naloxone (NARCAN) injection 0.2 mg  0.2 mg Intravenous Q2 Min PRN        Or     naloxone (NARCAN) injection 0.4 mg  0.4 mg Intravenous Q2 Min PRN        Or     naloxone (NARCAN) injection 0.2 mg  0.2 mg Intramuscular Q2 Min PRN        Or     naloxone (NARCAN) injection 0.4 mg  0.4 mg Intramuscular Q2 Min PRN         ondansetron (ZOFRAN-ODT) ODT tab 4 mg  4 mg Oral Q6H PRN   4 mg at 05/17/21 1333    Or     ondansetron (ZOFRAN) injection 4 mg  4 mg Intravenous Q6H PRN   4 mg at 05/17/21 1941     oxyCODONE (ROXICODONE) tablet 5-10 mg  5-10 mg Oral Q6H PRN   10 mg at 05/17/21 1944     prochlorperazine (COMPAZINE) injection 10 mg  10 mg Intravenous Q6H PRN        Or     prochlorperazine (COMPAZINE) tablet 10 mg  10 mg Oral Q6H PRN        Or     prochlorperazine (COMPAZINE) suppository 25 mg  25 mg Rectal Q12H PRN         Current Outpatient Medications Ordered in Epic   Medication     cefdinir (OMNICEF) 300 MG capsule     tamsulosin (FLOMAX) 0.4 MG capsule                  Physical Exam:   Vitals were reviewed  Patient Vitals for the past 8 hrs:   BP Temp Temp src Pulse Resp SpO2 Weight   05/18/21 0724 135/60 96.7  F (35.9  C) Oral 86 15 94 % --   05/18/21 0446 (!) 151/79 95.8  F (35.4  C) Oral 72 16 98 % 108.9 kg (240 lb 1.6 oz)     GEN: NAD, sitting in chair, eating  breakfast  EYES: EOMI  MOUTH: MMM  NECK: Supple  RESP: Unlabored breathing, RA  CARDIAC: No LE edema  NEURO: AAO  URO: Urinating on own. Noted some slight hematuria.           Data:   No results found for: NTBNPI, NTBNP  Lab Results   Component Value Date    WBC 13.2 (H) 05/17/2021    WBC 13.0 (H) 01/05/2012    HGB 12.0 05/17/2021    HGB 11.5 (L) 01/05/2012    HCT 38.2 05/17/2021    HCT 36.8 01/05/2012    MCV 83 05/17/2021    MCV 83 01/05/2012     (H) 05/17/2021     (H) 01/05/2012     Urine culture 05/11/21: E. Coli, pansensitive.

## 2021-05-21 DIAGNOSIS — Z11.59 ENCOUNTER FOR SCREENING FOR OTHER VIRAL DISEASES: ICD-10-CM

## 2021-05-28 LAB — INTERPRETATION ECG - MUSE: NORMAL

## 2021-06-01 RX ORDER — CLOBETASOL PROPIONATE 0.5 MG/G
OINTMENT TOPICAL PRN
COMMUNITY
Start: 2021-01-11

## 2021-06-04 ENCOUNTER — HOSPITAL ENCOUNTER (OUTPATIENT)
Dept: LAB | Facility: CLINIC | Age: 61
Discharge: HOME | End: 2021-06-04
Attending: UROLOGY | Admitting: UROLOGY
Payer: COMMERCIAL

## 2021-06-04 DIAGNOSIS — Z11.59 ENCOUNTER FOR SCREENING FOR OTHER VIRAL DISEASES: ICD-10-CM

## 2021-06-04 LAB
SARS-COV-2 RNA RESP QL NAA+PROBE: NORMAL
SPECIMEN SOURCE: NORMAL

## 2021-06-04 PROCEDURE — U0005 INFEC AGEN DETEC AMPLI PROBE: HCPCS | Performed by: UROLOGY

## 2021-06-04 PROCEDURE — U0003 INFECTIOUS AGENT DETECTION BY NUCLEIC ACID (DNA OR RNA); SEVERE ACUTE RESPIRATORY SYNDROME CORONAVIRUS 2 (SARS-COV-2) (CORONAVIRUS DISEASE [COVID-19]), AMPLIFIED PROBE TECHNIQUE, MAKING USE OF HIGH THROUGHPUT TECHNOLOGIES AS DESCRIBED BY CMS-2020-01-R: HCPCS | Performed by: UROLOGY

## 2021-06-05 LAB
LABORATORY COMMENT REPORT: NORMAL
SARS-COV-2 RNA RESP QL NAA+PROBE: NEGATIVE
SPECIMEN SOURCE: NORMAL

## 2021-06-07 ENCOUNTER — APPOINTMENT (OUTPATIENT)
Dept: GENERAL RADIOLOGY | Facility: CLINIC | Age: 61
End: 2021-06-07
Attending: UROLOGY
Payer: COMMERCIAL

## 2021-06-07 ENCOUNTER — ANESTHESIA EVENT (OUTPATIENT)
Dept: SURGERY | Facility: CLINIC | Age: 61
End: 2021-06-07
Payer: COMMERCIAL

## 2021-06-07 ENCOUNTER — ANESTHESIA (OUTPATIENT)
Dept: SURGERY | Facility: CLINIC | Age: 61
End: 2021-06-07
Payer: COMMERCIAL

## 2021-06-07 ENCOUNTER — HOSPITAL ENCOUNTER (OUTPATIENT)
Facility: CLINIC | Age: 61
Discharge: HOME | End: 2021-06-07
Attending: UROLOGY | Admitting: UROLOGY
Payer: COMMERCIAL

## 2021-06-07 VITALS
OXYGEN SATURATION: 95 % | SYSTOLIC BLOOD PRESSURE: 171 MMHG | WEIGHT: 236 LBS | HEIGHT: 63 IN | DIASTOLIC BLOOD PRESSURE: 89 MMHG | RESPIRATION RATE: 16 BRPM | BODY MASS INDEX: 41.82 KG/M2 | TEMPERATURE: 98.5 F | HEART RATE: 101 BPM

## 2021-06-07 DIAGNOSIS — N20.1 URETERAL STONE: ICD-10-CM

## 2021-06-07 LAB
GLUCOSE BLDC GLUCOMTR-MCNC: 113 MG/DL (ref 70–99)
GLUCOSE BLDC GLUCOMTR-MCNC: 120 MG/DL (ref 70–99)

## 2021-06-07 PROCEDURE — 272N000001 HC OR GENERAL SUPPLY STERILE: Performed by: UROLOGY

## 2021-06-07 PROCEDURE — C1769 GUIDE WIRE: HCPCS | Performed by: UROLOGY

## 2021-06-07 PROCEDURE — 360N000083 HC SURGERY LEVEL 3 W/ FLUORO, PER MIN: Performed by: UROLOGY

## 2021-06-07 PROCEDURE — 82962 GLUCOSE BLOOD TEST: CPT

## 2021-06-07 PROCEDURE — 999N000179 XR SURGERY CARM FLUORO LESS THAN 5 MIN W STILLS: Mod: TC

## 2021-06-07 PROCEDURE — 88300 SURGICAL PATH GROSS: CPT | Mod: 26 | Performed by: PATHOLOGY

## 2021-06-07 PROCEDURE — 250N000011 HC RX IP 250 OP 636: Performed by: UROLOGY

## 2021-06-07 PROCEDURE — 74420 UROGRAPHY RTRGR +-KUB: CPT | Mod: 26 | Performed by: UROLOGY

## 2021-06-07 PROCEDURE — 710N000009 HC RECOVERY PHASE 1, LEVEL 1, PER MIN: Performed by: UROLOGY

## 2021-06-07 PROCEDURE — 250N000009 HC RX 250: Performed by: UROLOGY

## 2021-06-07 PROCEDURE — 82365 CALCULUS SPECTROSCOPY: CPT | Performed by: UROLOGY

## 2021-06-07 PROCEDURE — 250N000009 HC RX 250: Performed by: NURSE ANESTHETIST, CERTIFIED REGISTERED

## 2021-06-07 PROCEDURE — 250N000011 HC RX IP 250 OP 636: Performed by: NURSE ANESTHETIST, CERTIFIED REGISTERED

## 2021-06-07 PROCEDURE — 258N000003 HC RX IP 258 OP 636: Performed by: ANESTHESIOLOGY

## 2021-06-07 PROCEDURE — 88300 SURGICAL PATH GROSS: CPT | Mod: TC | Performed by: UROLOGY

## 2021-06-07 PROCEDURE — 255N000002 HC RX 255 OP 636: Performed by: UROLOGY

## 2021-06-07 PROCEDURE — 250N000011 HC RX IP 250 OP 636: Performed by: ANESTHESIOLOGY

## 2021-06-07 PROCEDURE — 370N000017 HC ANESTHESIA TECHNICAL FEE, PER MIN: Performed by: UROLOGY

## 2021-06-07 PROCEDURE — 52353 CYSTOURETERO W/LITHOTRIPSY: CPT | Mod: RT | Performed by: UROLOGY

## 2021-06-07 PROCEDURE — 710N000012 HC RECOVERY PHASE 2, PER MINUTE: Performed by: UROLOGY

## 2021-06-07 PROCEDURE — 999N000141 HC STATISTIC PRE-PROCEDURE NURSING ASSESSMENT: Performed by: UROLOGY

## 2021-06-07 RX ORDER — METOCLOPRAMIDE HYDROCHLORIDE 5 MG/ML
10 INJECTION INTRAMUSCULAR; INTRAVENOUS EVERY 6 HOURS PRN
Status: DISCONTINUED | OUTPATIENT
Start: 2021-06-07 | End: 2021-06-07 | Stop reason: HOSPADM

## 2021-06-07 RX ORDER — DIMENHYDRINATE 50 MG/ML
25 INJECTION, SOLUTION INTRAMUSCULAR; INTRAVENOUS
Status: DISCONTINUED | OUTPATIENT
Start: 2021-06-07 | End: 2021-06-07 | Stop reason: HOSPADM

## 2021-06-07 RX ORDER — CEFAZOLIN SODIUM 2 G/100ML
2 INJECTION, SOLUTION INTRAVENOUS SEE ADMIN INSTRUCTIONS
Status: DISCONTINUED | OUTPATIENT
Start: 2021-06-07 | End: 2021-06-07 | Stop reason: HOSPADM

## 2021-06-07 RX ORDER — ONDANSETRON 4 MG/1
4 TABLET, ORALLY DISINTEGRATING ORAL EVERY 30 MIN PRN
Status: DISCONTINUED | OUTPATIENT
Start: 2021-06-07 | End: 2021-06-07 | Stop reason: HOSPADM

## 2021-06-07 RX ORDER — MEPERIDINE HYDROCHLORIDE 25 MG/ML
12.5 INJECTION INTRAMUSCULAR; INTRAVENOUS; SUBCUTANEOUS
Status: DISCONTINUED | OUTPATIENT
Start: 2021-06-07 | End: 2021-06-07 | Stop reason: HOSPADM

## 2021-06-07 RX ORDER — NALOXONE HYDROCHLORIDE 0.4 MG/ML
0.2 INJECTION, SOLUTION INTRAMUSCULAR; INTRAVENOUS; SUBCUTANEOUS
Status: DISCONTINUED | OUTPATIENT
Start: 2021-06-07 | End: 2021-06-07 | Stop reason: HOSPADM

## 2021-06-07 RX ORDER — DEXAMETHASONE SODIUM PHOSPHATE 4 MG/ML
INJECTION, SOLUTION INTRA-ARTICULAR; INTRALESIONAL; INTRAMUSCULAR; INTRAVENOUS; SOFT TISSUE PRN
Status: DISCONTINUED | OUTPATIENT
Start: 2021-06-07 | End: 2021-06-07

## 2021-06-07 RX ORDER — FENTANYL CITRATE 50 UG/ML
25-50 INJECTION, SOLUTION INTRAMUSCULAR; INTRAVENOUS
Status: DISCONTINUED | OUTPATIENT
Start: 2021-06-07 | End: 2021-06-07 | Stop reason: HOSPADM

## 2021-06-07 RX ORDER — LIDOCAINE 40 MG/G
CREAM TOPICAL
Status: DISCONTINUED | OUTPATIENT
Start: 2021-06-07 | End: 2021-06-07 | Stop reason: HOSPADM

## 2021-06-07 RX ORDER — SODIUM CHLORIDE, SODIUM LACTATE, POTASSIUM CHLORIDE, CALCIUM CHLORIDE 600; 310; 30; 20 MG/100ML; MG/100ML; MG/100ML; MG/100ML
INJECTION, SOLUTION INTRAVENOUS CONTINUOUS
Status: DISCONTINUED | OUTPATIENT
Start: 2021-06-07 | End: 2021-06-07 | Stop reason: HOSPADM

## 2021-06-07 RX ORDER — KETOROLAC TROMETHAMINE 30 MG/ML
INJECTION, SOLUTION INTRAMUSCULAR; INTRAVENOUS PRN
Status: DISCONTINUED | OUTPATIENT
Start: 2021-06-07 | End: 2021-06-07

## 2021-06-07 RX ORDER — NALOXONE HYDROCHLORIDE 0.4 MG/ML
0.4 INJECTION, SOLUTION INTRAMUSCULAR; INTRAVENOUS; SUBCUTANEOUS
Status: DISCONTINUED | OUTPATIENT
Start: 2021-06-07 | End: 2021-06-07 | Stop reason: HOSPADM

## 2021-06-07 RX ORDER — ONDANSETRON 2 MG/ML
4 INJECTION INTRAMUSCULAR; INTRAVENOUS EVERY 30 MIN PRN
Status: DISCONTINUED | OUTPATIENT
Start: 2021-06-07 | End: 2021-06-07 | Stop reason: HOSPADM

## 2021-06-07 RX ORDER — KETOROLAC TROMETHAMINE 30 MG/ML
30 INJECTION, SOLUTION INTRAMUSCULAR; INTRAVENOUS EVERY 6 HOURS PRN
Status: DISCONTINUED | OUTPATIENT
Start: 2021-06-07 | End: 2021-06-07 | Stop reason: HOSPADM

## 2021-06-07 RX ORDER — METOCLOPRAMIDE 10 MG/1
10 TABLET ORAL EVERY 6 HOURS PRN
Status: DISCONTINUED | OUTPATIENT
Start: 2021-06-07 | End: 2021-06-07 | Stop reason: HOSPADM

## 2021-06-07 RX ORDER — ONDANSETRON 2 MG/ML
INJECTION INTRAMUSCULAR; INTRAVENOUS PRN
Status: DISCONTINUED | OUTPATIENT
Start: 2021-06-07 | End: 2021-06-07

## 2021-06-07 RX ORDER — LIDOCAINE HYDROCHLORIDE 10 MG/ML
INJECTION, SOLUTION INFILTRATION; PERINEURAL PRN
Status: DISCONTINUED | OUTPATIENT
Start: 2021-06-07 | End: 2021-06-07

## 2021-06-07 RX ORDER — CEFAZOLIN SODIUM 2 G/100ML
2 INJECTION, SOLUTION INTRAVENOUS
Status: COMPLETED | OUTPATIENT
Start: 2021-06-07 | End: 2021-06-07

## 2021-06-07 RX ORDER — METOPROLOL TARTRATE 1 MG/ML
1-2 INJECTION, SOLUTION INTRAVENOUS EVERY 5 MIN PRN
Status: DISCONTINUED | OUTPATIENT
Start: 2021-06-07 | End: 2021-06-07 | Stop reason: HOSPADM

## 2021-06-07 RX ORDER — PROPOFOL 10 MG/ML
INJECTION, EMULSION INTRAVENOUS PRN
Status: DISCONTINUED | OUTPATIENT
Start: 2021-06-07 | End: 2021-06-07

## 2021-06-07 RX ORDER — HYDRALAZINE HYDROCHLORIDE 20 MG/ML
2.5-5 INJECTION INTRAMUSCULAR; INTRAVENOUS EVERY 10 MIN PRN
Status: DISCONTINUED | OUTPATIENT
Start: 2021-06-07 | End: 2021-06-07 | Stop reason: HOSPADM

## 2021-06-07 RX ORDER — FENTANYL CITRATE 50 UG/ML
INJECTION, SOLUTION INTRAMUSCULAR; INTRAVENOUS PRN
Status: DISCONTINUED | OUTPATIENT
Start: 2021-06-07 | End: 2021-06-07

## 2021-06-07 RX ORDER — GLYCOPYRROLATE 0.2 MG/ML
INJECTION, SOLUTION INTRAMUSCULAR; INTRAVENOUS PRN
Status: DISCONTINUED | OUTPATIENT
Start: 2021-06-07 | End: 2021-06-07

## 2021-06-07 RX ADMIN — ONDANSETRON HYDROCHLORIDE 4 MG: 2 INJECTION, SOLUTION INTRAVENOUS at 15:42

## 2021-06-07 RX ADMIN — FENTANYL CITRATE 50 MCG: 50 INJECTION, SOLUTION INTRAMUSCULAR; INTRAVENOUS at 16:49

## 2021-06-07 RX ADMIN — KETOROLAC TROMETHAMINE 30 MG: 30 INJECTION, SOLUTION INTRAMUSCULAR at 16:16

## 2021-06-07 RX ADMIN — CEFAZOLIN SODIUM 2 G: 2 INJECTION, SOLUTION INTRAVENOUS at 15:46

## 2021-06-07 RX ADMIN — SODIUM CHLORIDE, POTASSIUM CHLORIDE, SODIUM LACTATE AND CALCIUM CHLORIDE: 600; 310; 30; 20 INJECTION, SOLUTION INTRAVENOUS at 15:33

## 2021-06-07 RX ADMIN — FENTANYL CITRATE 100 MCG: 50 INJECTION, SOLUTION INTRAMUSCULAR; INTRAVENOUS at 15:41

## 2021-06-07 RX ADMIN — LIDOCAINE HYDROCHLORIDE 50 MG: 10 INJECTION, SOLUTION INFILTRATION; PERINEURAL at 15:41

## 2021-06-07 RX ADMIN — PROPOFOL 160 MG: 10 INJECTION, EMULSION INTRAVENOUS at 15:41

## 2021-06-07 RX ADMIN — MIDAZOLAM 2 MG: 1 INJECTION INTRAMUSCULAR; INTRAVENOUS at 15:31

## 2021-06-07 RX ADMIN — DEXAMETHASONE SODIUM PHOSPHATE 4 MG: 4 INJECTION, SOLUTION INTRA-ARTICULAR; INTRALESIONAL; INTRAMUSCULAR; INTRAVENOUS; SOFT TISSUE at 15:41

## 2021-06-07 RX ADMIN — GLYCOPYRROLATE 0.2 MG: 0.2 INJECTION, SOLUTION INTRAMUSCULAR; INTRAVENOUS at 15:42

## 2021-06-07 ASSESSMENT — MIFFLIN-ST. JEOR: SCORE: 1619.62

## 2021-06-07 NOTE — ANESTHESIA CARE TRANSFER NOTE
Patient: Estela Menjivar    Procedure(s):  Cystoscopy, right ureteral stent removal, right ureteroscopy with holmium laser lithotripsy and stone basketing    Diagnosis: Ureteral stone [N20.1]  Diagnosis Additional Information: No value filed.    Anesthesia Type:   General     Note:    Oropharynx: oropharynx clear of all foreign objects  Level of Consciousness: awake  Oxygen Supplementation: room air    Independent Airway: airway patency satisfactory and stable  Dentition: dentition unchanged  Vital Signs Stable: post-procedure vital signs reviewed and stable  Report to RN Given: handoff report given  Patient transferred to: PACU    Handoff Report: Identifed the Patient, Identified the Reponsible Provider, Reviewed the pertinent medical history, Discussed the surgical course, Reviewed Intra-OP anesthesia mangement and issues during anesthesia, Set expectations for post-procedure period and Allowed opportunity for questions and acknowledgement of understanding      Vitals: (Last set prior to Anesthesia Care Transfer)  CRNA VITALS  6/7/2021 1542 - 6/7/2021 1621      6/7/2021             Pulse:  109    SpO2:  100 %    Resp Rate (observed):  (!) 6        Electronically Signed By: MARIFER Barrera CRNA  June 7, 2021  4:21 PM

## 2021-06-07 NOTE — ANESTHESIA PROCEDURE NOTES
Airway       Patient location during procedure: OR  Staff -        CRNA: Temo Leslie APRN CRNA       Performed By: CRNA  Consent for Airway        Urgency: elective  Indications and Patient Condition       Indications for airway management: ann-procedural         Mask difficulty assessment: 0 - not attempted    Final Airway Details       Final airway type: supraglottic airway    Supraglottic Airway Details        Type: LMA       Brand: I-Gel       LMA size: 4    Post intubation assessment        Placement verified by: capnometry, equal breath sounds and chest rise        Number of attempts at approach: 1       Number of other approaches attempted: 0       Secured with: plastic tape       Ease of procedure: easy       Dentition: Intact and Unchanged

## 2021-06-07 NOTE — DISCHARGE INSTRUCTIONS
GENERAL ANESTHESIA OR SEDATION ADULT DISCHARGE INSTRUCTIONS   SPECIAL PRECAUTIONS FOR 24 HOURS AFTER SURGERY    IT IS NOT UNUSUAL TO FEEL LIGHT-HEADED OR FAINT, UP TO 24 HOURS AFTER SURGERY OR WHILE TAKING PAIN MEDICATION.  IF YOU HAVE THESE SYMPTOMS; SIT FOR A FEW MINUTES BEFORE STANDING AND HAVE SOMEONE ASSIST YOU WHEN YOU GET UP TO WALK OR USE THE BATHROOM.    YOU SHOULD REST AND RELAX FOR THE NEXT 24 HOURS AND YOU MUST MAKE ARRANGEMENTS TO HAVE SOMEONE STAY WITH YOU FOR AT LEAST 24 HOURS AFTER YOUR DISCHARGE.  AVOID HAZARDOUS AND STRENUOUS ACTIVITIES.  DO NOT MAKE IMPORTANT DECISIONS FOR 24 HOURS.    DO NOT DRIVE ANY VEHICLE OR OPERATE MECHANICAL EQUIPMENT FOR 24 HOURS FOLLOWING THE END OF YOUR SURGERY.  EVEN THOUGH YOU MAY FEEL NORMAL, YOUR REACTIONS MAY BE AFFECTED BY THE MEDICATION YOU HAVE RECEIVED.    DO NOT DRINK ALCOHOLIC BEVERAGES FOR 24 HOURS FOLLOWING YOUR SURGERY.    DRINK CLEAR LIQUIDS (APPLE JUICE, GINGER ALE, 7-UP, BROTH, ETC.).  PROGRESS TO YOUR REGULAR DIET AS YOU FEEL ABLE.    YOU MAY HAVE A DRY MOUTH, A SORE THROAT, MUSCLES ACHES OR TROUBLE SLEEPING.  THESE SHOULD GO AWAY AFTER 24 HOURS.    CALL YOUR DOCTOR FOR ANY OF THE FOLLOWING:  SIGNS OF INFECTION (FEVER, GROWING TENDERNESS AT THE SURGERY SITE, A LARGE AMOUNT OF DRAINAGE OR BLEEDING, SEVERE PAIN, FOUL-SMELLING DRAINAGE, REDNESS OR SWELLING.    IT HAS BEEN OVER 8 TO 10 HOURS SINCE SURGERY AND YOU ARE STILL NOT ABLE TO URINATE (PASS WATER).   CYSTOSCOPY DISCHARGE INSTRUCTIONS  [facility]  [address]END[address]ER[address]ING, [place], [address]  [phone]    YOU MAY GO BACK TO YOUR NORMAL DIET AND ACTIVITY, UNLESS YOUR DOCTOR TELLS YOU NOT TO.    FOR THE NEXT TWO DAYS, YOU MAY NOTICE:    SOME BLOOD IN YOUR URINE.  SOME BURNING WHEN YOU URINATE.  AN URGE TO URINATE MORE OFTEN.  BLADDER SPASMS.    THESE ARE NORMAL AFTER THE PROCEDURE.  THEY SHOULD GO AWAY AFTER A DAY OR TWO.  TO RELIEVE THESE PROBLEMS:     DRINK 6 TO 8 LARGE GLASSES OF  WATER EACH DAY (INCLUDES DRINKS AT MEALS).  THIS WILL HELP CLEAR THE URINE.    TAKE WARM BATHS TO RELIEVE PAIN AND BLADDER SPASMS.  DO NOT ADD ANYTHING TO THE BATH WATER.    YOUR DOCTOR MAY PRESCRIBE PAIN MEDICINE.  YOU MAY ALSO TAKE TYLENOL (ACETAMINOPHEN) FOR PAIN.    CALL YOUR SURGEON IF YOU HAVE:    A FEVER OVER 101 DEGREES.  CHECK YOUR TEMPERATURE UNDER YOUR TONGUE.    CHILLS.    FAILURE TO URINATE (NO URINE COMES OUT WHEN YOU TRY TO USE THE TOILET).  TRY SOAKING IN A BATHTUB FULL OF WARM WATER.  IF STILL NO URINE, CALL YOUR DOCTOR.    A LOT OF BLOOD IN THE URINE, OR BLOOD CLOTS LARGER THAN A NICKEL.      PAIN IN THE BACK OR BELLY AREA (ABDOMEN).    PAIN OR SPASMS THAT ARE NOT RELIEVED BY WARM TUB BATHS AND PAIN MEDICINE.      SEVERE PAIN, BURNING OR OTHER PROBLEMS WHILE PASSING URINE.    PAIN THAT GETS WORSE AFTER TWO DAYS.

## 2021-06-07 NOTE — OP NOTE
Procedure Date: 06/07/2021    SURGEON:  Jonathan Lezama MD    PREOPERATIVE DIAGNOSIS:  Right ureteral stone.    POSTOPERATIVE DIAGNOSIS:  Right ureteral stone.    PROCEDURE PERFORMED:  Cystoscopy, right ureteral stent removal, right retrograde pyelogram, interpretation of fluoroscopic images, right ureteroscopy with holmium laser lithotripsy and stone basketing.    ANESTHESIA:  Unknown.    COMPLICATIONS:  None.    INDICATIONS FOR PROCEDURE:  Estela Buckley is a 52-year-old woman who previously presented with a right ureteral stone and had a urinary tract infection.  She had a stent placed at that time.  She now presents for stent removal and stone removal.    DESCRIPTION OF PROCEDURE:  Risks and benefits of the procedure were explained in detail to the patient.  Informed consent was obtained.  The patient was brought to the operating room and placed supine on the table under general anesthetic.  She was then moved down to the dorsal lithotomy position where she was prepped and draped in sterile fashion.    Procedure began by introducing the 22 [place] rigid cystoscope, I introduced this through the urethra and into the bladder and performing cystoscopy.  I grasped the stent, pulled to the level of the urethral meatus.  I cannulated the stent with Glidewire under fluoroscopic guidance and removed the stent.  Alongside the wire, passed the rigid ureteroscope through this into the bladder and up the right ureter.  The stone was seen in the mid right ureter.  I used the 365-micron holmium laser fiber to break up the stone into multiple fragments.  These fragments were basketed out and placed in the bladder.  I then performed ureteroscopy all the way up to the kidney and no stones remained.  I injected contrast in the right kidney.  There were no filling defects present.  I carefully inspected the ureter to make certain there was no obstruction or stones remaining upon my exit.  I removed the wire and I did not need to  replace a stent.  I reinserted the cystoscope and flushed out the stone fragments in the bladder.  The procedure was concluded at this point.    The patient tolerated the procedure well without complications.  She went to the post-anesthetic care unit in good condition.  She will go home from there.    Jonathan Lezama MD        D: 2021   T: 2021   MT: PAKMT    Name:     МАРИНА MONGE  MRN:      [id number]        Account:        [id number]   :      1960           Procedure Date: 2021     Document: [id number]

## 2021-06-07 NOTE — ANESTHESIA PREPROCEDURE EVALUATION
Anesthesia Pre-Procedure Evaluation    Patient: Estela Menjivar   MRN: [id number] : 1960        Preoperative Diagnosis: Ureteral stone [N20.1]   Procedure : Procedure(s):  Cystoscopy, right ureteral stent removal, right ureteroscopy with holmium laser lithotripsy and stone basketing     Past Medical History:   Diagnosis Date     Diabetes mellitus (H)      H/O splenectomy      Hyperlipidemia      Hypertension      IFG (impaired fasting glucose)      Kidney stones      Splenosis      Urinary incontinence       Past Surgical History:   Procedure Laterality Date     ABDOMEN SURGERY      laparotomy( colon nick ), repair liver bleed,spleenectomy     COLONOSCOPY       COMBINED CYSTOSCOPY, INSERT STENT URETER(S) Right 2021    Procedure: Cystoscopy, right retrograde pyelogram, interpretation of fluoroscopic images, placement of 6 x 24 double-J right ureteral stent;  Surgeon: Jonathan Lezama MD;  Location:  OR     DILATION AND CURETTAGE, OPERATIVE HYSTEROSCOPY, COMBINED N/A 2018    Procedure: COMBINED DILATION AND CURETTAGE, OPERATIVE HYSTEROSCOPY;  COMBINED DILATION AND CURETTAGE, HYSTEROSCOPY ;  Surgeon: April Lara MD;  Location: [facility]     GYN SURGERY      c. section     HERNIA REPAIR      ventral hernia     ORTHOPEDIC SURGERY      ankle fracture repair     spleenectomy        No Known Allergies   Social History     Tobacco Use     Smoking status: Never Smoker     Smokeless tobacco: Never Used   Substance Use Topics     Alcohol use: Yes     Comment: 1/month      Wt Readings from Last 1 Encounters:   21 107 kg (236 lb)        Anesthesia Evaluation   Pt has had prior anesthetic. Type: General.        ROS/MED HX  ENT/Pulmonary:  - neg pulmonary ROS     Neurologic:  - neg neurologic ROS     Cardiovascular:     (+) Dyslipidemia hypertension-----    METS/Exercise Tolerance:     Hematologic: Comments: Lab Test        21                       0148          WBC          13.2*          HGB          12.0          MCV          83            PLT          505*           Lab Test        05/17/21                       0148          NA           136           POTASSIUM    4.0           CHLORIDE     106           CO2          27            BUN          18            CR           0.61          ANIONGAP     3             ELPIDIO          8.8           GLC          174*                Musculoskeletal:  - neg musculoskeletal ROS     GI/Hepatic:  - neg GI/hepatic ROS     Renal/Genitourinary: Comment: right ureteral stent    (+) renal disease, type: CRI,     Endo:     (+) type II DM, Not using insulin, - not using insulin pump. not previously admitted for DM/DKA.     Psychiatric/Substance Use:  - neg psychiatric ROS     Infectious Disease:  - neg infectious disease ROS     Malignancy:  - neg malignancy ROS     Other:            Physical Exam    Airway  airway exam normal      Mallampati: II   TM distance: > 3 FB   Neck ROM: full   Mouth opening: > 3 cm    Respiratory Devices and Support         Dental  no notable dental history         Cardiovascular   cardiovascular exam normal          Pulmonary   pulmonary exam normal                OUTSIDE LABS:  CBC:   Lab Results   Component Value Date    WBC 13.2 (H) 05/17/2021    WBC 13.0 (H) 01/05/2012    HGB 12.0 05/17/2021    HGB 11.5 (L) 01/05/2012    HCT 38.2 05/17/2021    HCT 36.8 01/05/2012     (H) 05/17/2021     (H) 01/05/2012     BMP:   Lab Results   Component Value Date     05/17/2021     01/05/2012    POTASSIUM 4.0 05/17/2021    POTASSIUM 4.0 01/05/2012    CHLORIDE 106 05/17/2021    CHLORIDE 103 01/05/2012    CO2 27 05/17/2021    CO2 26 01/05/2012    BUN 18 05/17/2021    BUN 13 01/05/2012    CR 0.61 05/17/2021    CR 0.58 01/05/2012     (H) 05/17/2021     (H) 01/05/2012     COAGS: No results found for: PTT, INR, FIBR  POC:   Lab Results   Component Value Date     (H) 06/07/2021     HEPATIC:   Lab Results   Component  Value Date    ALBUMIN 3.4 05/17/2021    PROTTOTAL 7.6 05/17/2021    ALT 27 05/17/2021    AST 13 05/17/2021    ALKPHOS 103 05/17/2021    BILITOTAL 0.3 05/17/2021     OTHER:   Lab Results   Component Value Date    ELPIDIO 8.8 05/17/2021       Anesthesia Plan    ASA Status:  3      Anesthesia Type: General.     - Airway: LMA   Induction: Intravenous, Propofol.   Maintenance: Balanced.        Consents    Anesthesia Plan(s) and associated risks, benefits, and realistic alternatives discussed. Questions answered and patient/representative(s) expressed understanding.     - Discussed with:  Patient      - Extended Intubation/Ventilatory Support Discussed: No.      - Patient is DNR/DNI Status: No    Use of blood products discussed: Yes.     - Discussed with: Patient.     - Consented: consented to blood products            Reason for refusal: other.     Postoperative Care    Pain management: IV analgesics.   PONV prophylaxis: Ondansetron (or other 5HT-3), Dexamethasone or Solumedrol     Comments:                Joe Aldridge MD

## 2021-06-07 NOTE — ANESTHESIA POSTPROCEDURE EVALUATION
Patient: Estela Menjivar    Procedure(s):  Cystoscopy, right ureteral stent removal, right ureteroscopy with holmium laser lithotripsy and stone basketing    Diagnosis:Ureteral stone [N20.1]  Diagnosis Additional Information: No value filed.    Anesthesia Type:  General    Note:     Postop Pain Control: Uneventful            Sign Out: Well controlled pain   PONV: No   Neuro/Psych: Uneventful            Sign Out: Acceptable/Baseline neuro status   Airway/Respiratory: Uneventful            Sign Out: Acceptable/Baseline resp. status   CV/Hemodynamics: Uneventful            Sign Out: Acceptable CV status; No obvious hypovolemia; No obvious fluid overload   Other NRE: NONE   DID A NON-ROUTINE EVENT OCCUR? No           Last vitals:  Vitals:    06/07/21 1625 06/07/21 1630 06/07/21 1641   BP: (!) 150/67  (!) 171/89   Pulse: 103 101    Resp: 8 15 16   Temp:   98.5  F (36.9  C)   SpO2: 94% 93% 95%       Last vitals prior to Anesthesia Care Transfer:  CRNA VITALS  6/7/2021 1542 - 6/7/2021 1642      6/7/2021             Pulse:  109    SpO2:  100 %    Resp Rate (observed):  (!) 6          Electronically Signed By: Joe Aldridge MD  June 7, 2021  5:05 PM

## 2021-06-08 ENCOUNTER — TELEPHONE (OUTPATIENT)
Dept: UROLOGY | Facility: CLINIC | Age: 61
End: 2021-06-08

## 2021-06-08 LAB — COPATH REPORT: NORMAL

## 2021-06-08 NOTE — TELEPHONE ENCOUNTER
Returned call to Estela. She asks what follow-up she needs. Have not received directions yet from Dr. Lezama. Scheduling will send him a message with this question. Also will be contacting her with stone analysis results. She expressed understanding.  SILVIA Wise RN      [facility]    Phone Message    May a detailed message be left on voicemail: yes     Reason for Call: Other: Pt calling because she had a procedure with  yesterday and she had a couple follow-up questions. She would like a call back to discuss.     Action Taken: Message routed to:  [facility] ([facility] uro    Travel Screening: Not Applicable

## 2021-06-11 LAB
APPEARANCE STONE: NORMAL
COMPN STONE: NORMAL
NUMBER STONE: 3
SIZE STONE: NORMAL MM
WT STONE: 22 MG

## 2021-07-04 ENCOUNTER — HEALTH MAINTENANCE LETTER (OUTPATIENT)
Age: 61
End: 2021-07-04

## 2021-10-24 ENCOUNTER — HEALTH MAINTENANCE LETTER (OUTPATIENT)
Age: 61
End: 2021-10-24

## 2022-07-31 ENCOUNTER — HEALTH MAINTENANCE LETTER (OUTPATIENT)
Age: 62
End: 2022-07-31

## 2022-10-15 ENCOUNTER — HEALTH MAINTENANCE LETTER (OUTPATIENT)
Age: 62
End: 2022-10-15

## 2023-08-20 ENCOUNTER — HEALTH MAINTENANCE LETTER (OUTPATIENT)
Age: 63
End: 2023-08-20

## (undated) DEVICE — GUIDEWIRE URO STR STIFF .035"X150CM NITINOL 150NSS35

## (undated) DEVICE — PREP TECHNI-CARE CHLOROXYLENOL 3% 4OZ BOTTLE C222-4ZWO

## (undated) DEVICE — SOL WATER IRRIG 1000ML BOTTLE 2F7114

## (undated) DEVICE — CATH INTERMITTENT CLEAN-CATH FEMALE 14FR 6" VINYL LF 420614

## (undated) DEVICE — GLOVE PROTEXIS POWDER FREE SMT 7.5  2D72PT75X

## (undated) DEVICE — TUBING SUCTION 12"X1/4" N612

## (undated) DEVICE — TUBING IRRIG TUR Y TYPE 96" LF 6543-01

## (undated) DEVICE — PACK CYSTO CUSTOM RIDGES

## (undated) DEVICE — SYR 50ML SLIP TIP W/O NDL 309654

## (undated) DEVICE — GLOVE PROTEXIS W/NEU-THERA 6.5  2D73TE65

## (undated) DEVICE — LASER FIBER HOLMIUM 365UM HTB-365

## (undated) DEVICE — KIT ENDO FIRST STEP DISINFECTANT 200ML W/POUCH EP-4

## (undated) DEVICE — LINEN HALF SHEET 5512

## (undated) DEVICE — RAD RX ISOVUE 300 (50ML) 61% IOPAMIDOL CHARGE PER ML

## (undated) DEVICE — GLOVE PROTEXIS MICRO 7.0  2D73PM70

## (undated) DEVICE — SOL NACL 0.9% IRRIG 1000ML BOTTLE 07138-09

## (undated) DEVICE — LINEN TOWEL PACK X5 5464

## (undated) DEVICE — COVER FOOTSWITCH W/CINCH 20X24" 923267

## (undated) DEVICE — SOL WATER IRRIG 3000ML BAG 2B7117

## (undated) DEVICE — LINEN FULL SHEET 5511

## (undated) DEVICE — SOL NACL 0.9% IRRIG 3000ML BAG 2B7477

## (undated) DEVICE — SOL NACL 0.9% INJ 1000ML BAG 2B1324X

## (undated) DEVICE — BASIN SET MINOR DISP

## (undated) DEVICE — BASKET RETRIEVAL 1.9FRX120CM ESCAPE NTNL 4 WIRE 390-201

## (undated) DEVICE — SYR 30ML LL W/O NDL 302832

## (undated) DEVICE — CATH URETERAL FLEX TIP TIGERTAIL 06FRX70CM 139006

## (undated) DEVICE — PACK TVT HYSTEROSCOPY SMA15HYFSE

## (undated) DEVICE — BAG CLEAR TRASH 1.3M 39X33" P4040C

## (undated) DEVICE — SUCTION CANISTER MEDIVAC LINER 3000ML W/LID 65651-530

## (undated) RX ORDER — ATROPA BELLADONNA AND OPIUM 16.2; 3 MG/1; MG/1
SUPPOSITORY RECTAL
Status: DISPENSED
Start: 2021-05-17

## (undated) RX ORDER — CEFAZOLIN SODIUM 2 G/100ML
INJECTION, SOLUTION INTRAVENOUS
Status: DISPENSED
Start: 2021-05-17

## (undated) RX ORDER — FENTANYL CITRATE 50 UG/ML
INJECTION, SOLUTION INTRAMUSCULAR; INTRAVENOUS
Status: DISPENSED
Start: 2021-06-07

## (undated) RX ORDER — FENTANYL CITRATE 50 UG/ML
INJECTION, SOLUTION INTRAMUSCULAR; INTRAVENOUS
Status: DISPENSED
Start: 2018-09-05

## (undated) RX ORDER — KETOROLAC TROMETHAMINE 30 MG/ML
INJECTION, SOLUTION INTRAMUSCULAR; INTRAVENOUS
Status: DISPENSED
Start: 2018-09-05

## (undated) RX ORDER — DEXAMETHASONE SODIUM PHOSPHATE 4 MG/ML
INJECTION, SOLUTION INTRA-ARTICULAR; INTRALESIONAL; INTRAMUSCULAR; INTRAVENOUS; SOFT TISSUE
Status: DISPENSED
Start: 2018-09-05

## (undated) RX ORDER — HYDROCODONE BITARTRATE AND ACETAMINOPHEN 5; 325 MG/1; MG/1
TABLET ORAL
Status: DISPENSED
Start: 2018-09-05

## (undated) RX ORDER — FENTANYL CITRATE-0.9 % NACL/PF 10 MCG/ML
PLASTIC BAG, INJECTION (ML) INTRAVENOUS
Status: DISPENSED
Start: 2021-05-17

## (undated) RX ORDER — ONDANSETRON 2 MG/ML
INJECTION INTRAMUSCULAR; INTRAVENOUS
Status: DISPENSED
Start: 2018-09-05

## (undated) RX ORDER — ACETAMINOPHEN 325 MG/1
TABLET ORAL
Status: DISPENSED
Start: 2021-05-17

## (undated) RX ORDER — LIDOCAINE HYDROCHLORIDE 20 MG/ML
INJECTION, SOLUTION EPIDURAL; INFILTRATION; INTRACAUDAL; PERINEURAL
Status: DISPENSED
Start: 2018-09-05

## (undated) RX ORDER — FENTANYL CITRATE 50 UG/ML
INJECTION, SOLUTION INTRAMUSCULAR; INTRAVENOUS
Status: DISPENSED
Start: 2021-05-17

## (undated) RX ORDER — PROPOFOL 10 MG/ML
INJECTION, EMULSION INTRAVENOUS
Status: DISPENSED
Start: 2018-09-05